# Patient Record
Sex: MALE | Race: BLACK OR AFRICAN AMERICAN | NOT HISPANIC OR LATINO | Employment: FULL TIME | URBAN - METROPOLITAN AREA
[De-identification: names, ages, dates, MRNs, and addresses within clinical notes are randomized per-mention and may not be internally consistent; named-entity substitution may affect disease eponyms.]

---

## 2019-08-06 ENCOUNTER — HOSPITAL ENCOUNTER (EMERGENCY)
Facility: HOSPITAL | Age: 19
Discharge: HOME | End: 2019-08-06
Attending: EMERGENCY MEDICINE

## 2019-08-06 VITALS
HEIGHT: 70 IN | BODY MASS INDEX: 24.34 KG/M2 | RESPIRATION RATE: 18 BRPM | SYSTOLIC BLOOD PRESSURE: 123 MMHG | WEIGHT: 170 LBS | OXYGEN SATURATION: 100 % | DIASTOLIC BLOOD PRESSURE: 67 MMHG | TEMPERATURE: 99 F | HEART RATE: 64 BPM

## 2019-08-06 DIAGNOSIS — R07.89 MUSCULOSKELETAL CHEST PAIN: ICD-10-CM

## 2019-08-06 DIAGNOSIS — R94.31 ABNORMAL EKG: ICD-10-CM

## 2019-08-06 DIAGNOSIS — R07.89 CHEST DISCOMFORT: Primary | ICD-10-CM

## 2019-08-06 LAB
ALBUMIN SERPL BCP-MCNC: 4.1 G/DL (ref 3.5–5.2)
ALP SERPL-CCNC: 53 U/L (ref 55–135)
ALT SERPL W/O P-5'-P-CCNC: 13 U/L (ref 10–44)
ANION GAP SERPL CALC-SCNC: 7 MMOL/L (ref 8–16)
AST SERPL-CCNC: 18 U/L (ref 10–40)
BASOPHILS # BLD AUTO: 0.01 K/UL (ref 0–0.2)
BASOPHILS NFR BLD: 0.3 % (ref 0–1.9)
BILIRUB SERPL-MCNC: 0.6 MG/DL (ref 0.1–1)
BNP SERPL-MCNC: <10 PG/ML (ref 0–99)
BUN SERPL-MCNC: 13 MG/DL (ref 6–20)
CALCIUM SERPL-MCNC: 9.4 MG/DL (ref 8.7–10.5)
CHLORIDE SERPL-SCNC: 107 MMOL/L (ref 95–110)
CO2 SERPL-SCNC: 25 MMOL/L (ref 23–29)
CREAT SERPL-MCNC: 1.1 MG/DL (ref 0.5–1.4)
DIFFERENTIAL METHOD: NORMAL
EOSINOPHIL # BLD AUTO: 0.2 K/UL (ref 0–0.5)
EOSINOPHIL NFR BLD: 4.1 % (ref 0–8)
ERYTHROCYTE [DISTWIDTH] IN BLOOD BY AUTOMATED COUNT: 13 % (ref 11.5–14.5)
EST. GFR  (AFRICAN AMERICAN): >60 ML/MIN/1.73 M^2
EST. GFR  (NON AFRICAN AMERICAN): >60 ML/MIN/1.73 M^2
GLUCOSE SERPL-MCNC: 101 MG/DL (ref 70–110)
HCT VFR BLD AUTO: 44.3 % (ref 40–54)
HGB BLD-MCNC: 14.5 G/DL (ref 14–18)
LYMPHOCYTES # BLD AUTO: 1 K/UL (ref 1–4.8)
LYMPHOCYTES NFR BLD: 25.8 % (ref 18–48)
MCH RBC QN AUTO: 29.5 PG (ref 27–31)
MCHC RBC AUTO-ENTMCNC: 32.7 G/DL (ref 32–36)
MCV RBC AUTO: 90 FL (ref 82–98)
MONOCYTES # BLD AUTO: 0.3 K/UL (ref 0.3–1)
MONOCYTES NFR BLD: 8.2 % (ref 4–15)
NEUTROPHILS # BLD AUTO: 2.4 K/UL (ref 1.8–7.7)
NEUTROPHILS NFR BLD: 61.9 % (ref 38–73)
PLATELET # BLD AUTO: 193 K/UL (ref 150–350)
PMV BLD AUTO: 10.6 FL (ref 9.2–12.9)
POTASSIUM SERPL-SCNC: 4.4 MMOL/L (ref 3.5–5.1)
PROT SERPL-MCNC: 7.5 G/DL (ref 6–8.4)
RBC # BLD AUTO: 4.92 M/UL (ref 4.6–6.2)
SODIUM SERPL-SCNC: 139 MMOL/L (ref 136–145)
TROPONIN I SERPL DL<=0.01 NG/ML-MCNC: <0.006 NG/ML (ref 0–0.03)
WBC # BLD AUTO: 3.91 K/UL (ref 3.9–12.7)

## 2019-08-06 PROCEDURE — 80053 COMPREHEN METABOLIC PANEL: CPT

## 2019-08-06 PROCEDURE — 83880 ASSAY OF NATRIURETIC PEPTIDE: CPT

## 2019-08-06 PROCEDURE — 85025 COMPLETE CBC W/AUTO DIFF WBC: CPT

## 2019-08-06 PROCEDURE — 84484 ASSAY OF TROPONIN QUANT: CPT

## 2019-08-06 PROCEDURE — 93010 EKG 12-LEAD: ICD-10-PCS | Mod: ,,, | Performed by: INTERNAL MEDICINE

## 2019-08-06 PROCEDURE — 99285 EMERGENCY DEPT VISIT HI MDM: CPT

## 2019-08-06 PROCEDURE — 93005 ELECTROCARDIOGRAM TRACING: CPT

## 2019-08-06 PROCEDURE — 93010 ELECTROCARDIOGRAM REPORT: CPT | Mod: ,,, | Performed by: INTERNAL MEDICINE

## 2019-08-06 RX ORDER — NAPROXEN 500 MG/1
500 TABLET ORAL EVERY 12 HOURS PRN
Qty: 20 TABLET | Refills: 0 | Status: SHIPPED | OUTPATIENT
Start: 2019-08-06 | End: 2019-12-11 | Stop reason: CLARIF

## 2019-08-06 NOTE — DISCHARGE INSTRUCTIONS
Follow-up with Cardiology for repeat EKG.  Follow-up with your regular doctor for further testing and treatment as well. Return to the emergency department for any new or worsening symptoms or as needed for any additional concerns.    Thank you for coming to our Emergency Department today. It is important to remember that some problems are difficult to diagnose and may not be found during your first visit. Be sure to follow up with your primary care doctor.  If you do not have one, you may contact the one listed on your discharge paperwork or you may also call the [facility] Appointment Desk at [phone] to schedule an appointment with one.     Return to the ER with any questions/concerns, new/concerning symptoms, worsening or failure to improve. Do not drive or make any important decisions for 24 hours if you have received any pain medications, sedatives or mood altering drugs during your ER visit.

## 2019-08-06 NOTE — ED PROVIDER NOTES
Encounter Date: 8/6/2019    SCRIBE #1 NOTE: I, Mo Neha, am scribing for, and in the presence of,  Raheem Singh NP. I have scribed the following portions of the note - Other sections scribed: HPI, ROS, PE.       History     Chief Complaint   Patient presents with    Chest Pain     midsternal cp with associated sob x 2 days. pt describes pain as sharp. he denies any recent illness     19 y.o. male with PMHx of heart murmur presents to the ED with complaints of right chest pain and associated back pain beginning 2 days ago. Pain has been constant. Patient reports taking Advil and Tylenol for Sx, with some mild relief. 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 Pain is worsened with movement and lifting. Pain is not worsened with increased cardiovascular activity. Denies shortness of breath, abd pain, syncope, nausea, vomiting, diaphoresis, or any additional symptoms.          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 No pertinent surgical history.  History reviewed. No pertinent family history.  Social History     Tobacco Use    Smoking status: Former Smoker     Types: Cigars    Smokeless tobacco: Never Used    Tobacco comment: pt. reports he stopped smoning black and milds about 2 weeks ago ( 7/20/19)   Substance Use Topics    Alcohol use: Never     Frequency: Never    Drug use: Never     Review of Systems   Constitutional: Negative for fever.   HENT: Negative for sore throat.    Respiratory: Negative for shortness of breath.    Cardiovascular: Positive for chest pain.   Gastrointestinal: Negative for nausea.   Genitourinary: Negative for dysuria.   Musculoskeletal: Positive for back pain.   Skin: Negative for rash.   Neurological: Negative for weakness.   Hematological: Does not bruise/bleed easily.       Physical Exam     Initial  Vitals [08/06/19 1229]   BP Pulse Resp Temp SpO2   129/73 66 15 98.3 °F (36.8 °C) 99 %      MAP       --         Physical Exam    Nursing note and vitals reviewed.  Constitutional: He appears well-developed and well-nourished. He is not diaphoretic. No distress.   HENT:   Head: Normocephalic and atraumatic.   Mouth/Throat: Oropharynx is clear and moist.   Eyes: Conjunctivae and EOM are normal. Pupils are equal, round, and reactive to light. No scleral icterus.   Neck: Normal range of motion. Neck supple. No JVD present.   Cardiovascular: Normal rate, regular rhythm and intact distal pulses.   Pulmonary/Chest: Effort normal and breath sounds normal. No accessory muscle usage or stridor. No tachypnea. No respiratory distress. He exhibits tenderness.   Tenderness to the right anterior chest over the medial right pectoral muscle.   Abdominal: Soft. Bowel sounds are normal. He exhibits no distension. There is no tenderness.   Musculoskeletal: Normal range of motion. He exhibits no edema or tenderness.   Generalized tenderness to the thoracolumbar paraspinal musculature.   No midline thoracic or lumbar tenderness.    Neurological: He is alert and oriented to person, place, and time. He has normal strength. No cranial nerve deficit or sensory deficit.   Skin: Skin is warm and dry. No rash noted.   Psychiatric: He has a normal mood and affect.         ED Course   Procedures  Labs Reviewed   COMPREHENSIVE METABOLIC PANEL - Abnormal; Notable for the following components:       Result Value    Alkaline Phosphatase 53 (*)     Anion Gap 7 (*)     All other components within normal limits   CBC W/ AUTO DIFFERENTIAL   TROPONIN I   B-TYPE NATRIURETIC PEPTIDE        ECG Results          EKG 12-lead (Final result)  Result time 08/06/19 21:14:27    Final result by Interface, Lab In [facility] (08/06/19 21:14:27)                 Narrative:    Test Reason : R07.89,    Vent. Rate : 059 BPM     Atrial Rate : 059 BPM     P-R Int : 132 ms           QRS Dur : 094 ms      QT Int : 370 ms       P-R-T Axes : 023 076 024 degrees     QTc Int : 366 ms    Sinus bradycardia  Voltage criteria for left ventricular hypertrophy  Abnormal ECG  No previous ECGs available  Confirmed by Jovanny Davies MD ([id number]) on 8/6/2019 9:14:19 PM    Referred By: NAVNEET   SELF           Confirmed By:Jovanny Davies MD                            Imaging Results          X-Ray Chest PA And Lateral (Final result)  Result time 08/06/19 12:58:35    Final result by Gray Everett MD (08/06/19 12:58:35)                 Impression:      See above      Electronically signed by: Gray Everett MD  Date:    08/06/2019  Time:    12:58             Narrative:    EXAMINATION:  XR CHEST PA AND LATERAL    CLINICAL HISTORY:  Other chest pain    TECHNIQUE:  PA and lateral views of the chest were performed.    COMPARISON:  None    FINDINGS:  Heart size normal.  The lungs are clear.  No pleural effusion                                 Medical Decision Making:   History:   Old Medical Records: I decided to obtain old medical records.  Differential Diagnosis:   Musculoskeletal pain, myocardial ischemia, malignant arrhythmia, pericarditis, pneumothorax, PE, others  Clinical Tests:   Lab Tests: Ordered and Reviewed  Radiological Study: Ordered and Reviewed  ED Management:  HPI and physical exam as above.    19 year old male with right-sided chest and thoracolumbar back pain that has been constant for the past two days. No known cardiac risk factors. HEART score is 0. Pt is PERC negative. Ordered labs due to EKG with possible LVH and bradycardia. Labs are reassuring. Chest x-ray is unremarkable. Given the above, pt's history of lifting heavy objects at work, and the fact that chest pain is reproducible the etiology of the pt's symptoms is most likely musculoskeletal. Will treat with NSAIDs and a short course of muscle relaxers. Advised patient to follow up with cardiology for abnormal EKG and for  re-evaluation and further management.  ED return precautions given. All questions regarding diagnosis and plan were answered to the patient's fullest possible satisfaction. Patient expressed understanding of diagnosis, discharge instructions, and return precautions.        Scribe attestation: I, Raheem Singh NP, personally performed the services described in this documentation. All medical record entries made by the scribe were at my direction and in my presence.  I have reviewed the chart and agree that the record reflects my personal performance and is accurate and complete.                   Clinical Impression:       ICD-10-CM ICD-9-CM   1. Chest discomfort R07.89 786.59   2. Musculoskeletal chest pain R07.89 786.59   3. Abnormal EKG R94.31 794.31         Disposition:   Disposition: Discharged  Condition: Stable                        Raheem Singh NP  08/08/19 1214

## 2019-09-17 ENCOUNTER — HOSPITAL ENCOUNTER (OUTPATIENT)
Facility: HOSPITAL | Age: 19
Discharge: HOME | End: 2019-09-18
Attending: EMERGENCY MEDICINE | Admitting: OTOLARYNGOLOGY
Payer: MEDICAID

## 2019-09-17 DIAGNOSIS — J36 PERITONSILLAR ABSCESS: ICD-10-CM

## 2019-09-17 DIAGNOSIS — J03.90 TONSILLITIS: Primary | ICD-10-CM

## 2019-09-17 DIAGNOSIS — J02.9 SORE THROAT: ICD-10-CM

## 2019-09-17 LAB
ALBUMIN SERPL BCP-MCNC: 4.4 G/DL (ref 3.5–5.2)
ALP SERPL-CCNC: 57 U/L (ref 55–135)
ALT SERPL W/O P-5'-P-CCNC: 16 U/L (ref 10–44)
ANION GAP SERPL CALC-SCNC: 9 MMOL/L (ref 8–16)
ANION GAP SERPL CALC-SCNC: 9 MMOL/L (ref 8–16)
AST SERPL-CCNC: 21 U/L (ref 10–40)
BASOPHILS # BLD AUTO: 0.01 K/UL (ref 0–0.2)
BASOPHILS NFR BLD: 0.1 % (ref 0–1.9)
BILIRUB SERPL-MCNC: 1.4 MG/DL (ref 0.1–1)
BUN SERPL-MCNC: 10 MG/DL (ref 6–30)
BUN SERPL-MCNC: 9 MG/DL (ref 6–20)
CALCIUM SERPL-MCNC: 9.9 MG/DL (ref 8.7–10.5)
CHLORIDE SERPL-SCNC: 103 MMOL/L (ref 95–110)
CHLORIDE SERPL-SCNC: 104 MMOL/L (ref 95–110)
CO2 SERPL-SCNC: 26 MMOL/L (ref 23–29)
CREAT SERPL-MCNC: 1.1 MG/DL (ref 0.5–1.4)
CREAT SERPL-MCNC: 1.2 MG/DL (ref 0.5–1.4)
CTP QC/QA: YES
DEPRECATED S PYO AG THROAT QL EIA: NEGATIVE
DIFFERENTIAL METHOD: ABNORMAL
EOSINOPHIL # BLD AUTO: 0 K/UL (ref 0–0.5)
EOSINOPHIL NFR BLD: 0.3 % (ref 0–8)
ERYTHROCYTE [DISTWIDTH] IN BLOOD BY AUTOMATED COUNT: 12.7 % (ref 11.5–14.5)
EST. GFR  (AFRICAN AMERICAN): >60 ML/MIN/1.73 M^2
EST. GFR  (NON AFRICAN AMERICAN): >60 ML/MIN/1.73 M^2
GLUCOSE SERPL-MCNC: 97 MG/DL (ref 70–110)
GLUCOSE SERPL-MCNC: 98 MG/DL (ref 70–110)
HCT VFR BLD AUTO: 46.6 % (ref 40–54)
HCT VFR BLD CALC: 47 %PCV (ref 36–54)
HGB BLD-MCNC: 15.4 G/DL (ref 14–18)
LACTATE SERPL-SCNC: 1.1 MMOL/L (ref 0.5–2.2)
LYMPHOCYTES # BLD AUTO: 0.9 K/UL (ref 1–4.8)
LYMPHOCYTES NFR BLD: 10.9 % (ref 18–48)
MCH RBC QN AUTO: 29.6 PG (ref 27–31)
MCHC RBC AUTO-ENTMCNC: 33 G/DL (ref 32–36)
MCV RBC AUTO: 89 FL (ref 82–98)
MONOCYTES # BLD AUTO: 1.2 K/UL (ref 0.3–1)
MONOCYTES NFR BLD: 14.4 % (ref 4–15)
NEUTROPHILS # BLD AUTO: 6.4 K/UL (ref 1.8–7.7)
NEUTROPHILS NFR BLD: 74.4 % (ref 38–73)
PLATELET # BLD AUTO: 188 K/UL (ref 150–350)
PMV BLD AUTO: 11 FL (ref 9.2–12.9)
POC IONIZED CALCIUM: 1.07 MMOL/L (ref 1.06–1.42)
POC MOLECULAR INFLUENZA A AGN: NEGATIVE
POC MOLECULAR INFLUENZA B AGN: NEGATIVE
POC TCO2 (MEASURED): 31 MMOL/L (ref 23–29)
POTASSIUM BLD-SCNC: 8.6 MMOL/L (ref 3.5–5.1)
POTASSIUM SERPL-SCNC: 3.9 MMOL/L (ref 3.5–5.1)
PROT SERPL-MCNC: 8.3 G/DL (ref 6–8.4)
RBC # BLD AUTO: 5.21 M/UL (ref 4.6–6.2)
SAMPLE: ABNORMAL
SODIUM BLD-SCNC: 134 MMOL/L (ref 136–145)
SODIUM SERPL-SCNC: 139 MMOL/L (ref 136–145)
WBC # BLD AUTO: 8.63 K/UL (ref 3.9–12.7)

## 2019-09-17 PROCEDURE — 84295 ASSAY OF SERUM SODIUM: CPT

## 2019-09-17 PROCEDURE — 83605 ASSAY OF LACTIC ACID: CPT

## 2019-09-17 PROCEDURE — 25000003 PHARM REV CODE 250: Performed by: NURSE PRACTITIONER

## 2019-09-17 PROCEDURE — 87040 BLOOD CULTURE FOR BACTERIA: CPT | Mod: 59

## 2019-09-17 PROCEDURE — 93010 ELECTROCARDIOGRAM REPORT: CPT | Mod: ,,, | Performed by: INTERNAL MEDICINE

## 2019-09-17 PROCEDURE — 93005 ELECTROCARDIOGRAM TRACING: CPT

## 2019-09-17 PROCEDURE — 96361 HYDRATE IV INFUSION ADD-ON: CPT

## 2019-09-17 PROCEDURE — 87081 CULTURE SCREEN ONLY: CPT

## 2019-09-17 PROCEDURE — 99900035 HC TECH TIME PER 15 MIN (STAT)

## 2019-09-17 PROCEDURE — 99285 EMERGENCY DEPT VISIT HI MDM: CPT | Mod: 25

## 2019-09-17 PROCEDURE — 87502 INFLUENZA DNA AMP PROBE: CPT

## 2019-09-17 PROCEDURE — 87880 STREP A ASSAY W/OPTIC: CPT

## 2019-09-17 PROCEDURE — 82330 ASSAY OF CALCIUM: CPT

## 2019-09-17 PROCEDURE — 82565 ASSAY OF CREATININE: CPT

## 2019-09-17 PROCEDURE — 96375 TX/PRO/DX INJ NEW DRUG ADDON: CPT

## 2019-09-17 PROCEDURE — 25500020 PHARM REV CODE 255: Performed by: EMERGENCY MEDICINE

## 2019-09-17 PROCEDURE — 63600175 PHARM REV CODE 636 W HCPCS: Performed by: NURSE PRACTITIONER

## 2019-09-17 PROCEDURE — 96372 THER/PROPH/DIAG INJ SC/IM: CPT | Mod: 59

## 2019-09-17 PROCEDURE — 80053 COMPREHEN METABOLIC PANEL: CPT

## 2019-09-17 PROCEDURE — 85025 COMPLETE CBC W/AUTO DIFF WBC: CPT

## 2019-09-17 PROCEDURE — 93010 EKG 12-LEAD: ICD-10-PCS | Mod: ,,, | Performed by: INTERNAL MEDICINE

## 2019-09-17 PROCEDURE — S0077 INJECTION, CLINDAMYCIN PHOSP: HCPCS | Performed by: NURSE PRACTITIONER

## 2019-09-17 PROCEDURE — 84132 ASSAY OF SERUM POTASSIUM: CPT

## 2019-09-17 PROCEDURE — 96365 THER/PROPH/DIAG IV INF INIT: CPT

## 2019-09-17 PROCEDURE — 85014 HEMATOCRIT: CPT

## 2019-09-17 RX ORDER — KETOROLAC TROMETHAMINE 30 MG/ML
15 INJECTION, SOLUTION INTRAMUSCULAR; INTRAVENOUS
Status: COMPLETED | OUTPATIENT
Start: 2019-09-17 | End: 2019-09-17

## 2019-09-17 RX ORDER — MORPHINE SULFATE 10 MG/ML
4 INJECTION INTRAMUSCULAR; INTRAVENOUS; SUBCUTANEOUS
Status: COMPLETED | OUTPATIENT
Start: 2019-09-17 | End: 2019-09-17

## 2019-09-17 RX ORDER — ACETAMINOPHEN 500 MG
1000 TABLET ORAL
Status: DISPENSED | OUTPATIENT
Start: 2019-09-17 | End: 2019-09-18

## 2019-09-17 RX ORDER — DEXAMETHASONE SODIUM PHOSPHATE 4 MG/ML
12 INJECTION, SOLUTION INTRA-ARTICULAR; INTRALESIONAL; INTRAMUSCULAR; INTRAVENOUS; SOFT TISSUE
Status: COMPLETED | OUTPATIENT
Start: 2019-09-17 | End: 2019-09-17

## 2019-09-17 RX ORDER — CLINDAMYCIN PHOSPHATE 900 MG/50ML
900 INJECTION, SOLUTION INTRAVENOUS
Status: COMPLETED | OUTPATIENT
Start: 2019-09-17 | End: 2019-09-17

## 2019-09-17 RX ADMIN — CLINDAMYCIN IN 5 PERCENT DEXTROSE 900 MG: 18 INJECTION, SOLUTION INTRAVENOUS at 06:09

## 2019-09-17 RX ADMIN — KETOROLAC TROMETHAMINE 15 MG: 30 INJECTION, SOLUTION INTRAMUSCULAR; INTRAVENOUS at 04:09

## 2019-09-17 RX ADMIN — DEXAMETHASONE SODIUM PHOSPHATE 12 MG: 4 INJECTION, SOLUTION INTRA-ARTICULAR; INTRALESIONAL; INTRAMUSCULAR; INTRAVENOUS; SOFT TISSUE at 06:09

## 2019-09-17 RX ADMIN — IOHEXOL 75 ML: 350 INJECTION, SOLUTION INTRAVENOUS at 06:09

## 2019-09-17 RX ADMIN — MORPHINE SULFATE 4 MG: 10 INJECTION INTRAVENOUS at 07:09

## 2019-09-17 RX ADMIN — SODIUM CHLORIDE 1000 ML: 0.9 INJECTION, SOLUTION INTRAVENOUS at 07:09

## 2019-09-17 NOTE — PROGRESS NOTES
Results for TONY MONTOYA (MRN [id number]) as of 9/17/2019 18:21   Ref. Range 9/17/2019 16:47 9/17/2019 17:27   POC Sodium Latest Ref Range: 136 - 145 mmol/L  134 (L)   POC Potassium Latest Ref Range: 3.5 - 5.1 mmol/L  8.6 (HH)   POC Chloride Latest Ref Range: 95 - 110 mmol/L  103   POC Anion Gap Latest Ref Range: 8 - 16 mmol/L  9   POC BUN Latest Ref Range: 6 - 30 mg/dL  10   POC Creatinine Latest Ref Range: 0.5 - 1.4 mg/dL  1.1   POC Glucose Latest Ref Range: 70 - 110 mg/dL  97   POC Ionized Calcium Latest Ref Range: 1.06 - 1.42 mmol/L  1.07   POC Hematocrit Latest Ref Range: 36 - 54 %PCV  47   POC TCO2 (MEASURED) Latest Ref Range: 23 - 29 mmol/L  31 (H)   Sample Unknown  VENOUS   POC Molecular Influenza A Ag Latest Ref Range: Negative, Not Reported  Negative    POC Molecular Influenza B Ag Latest Ref Range: Negative, Not Reported  Negative     Acceptable Unknown Yes

## 2019-09-17 NOTE — ED PROVIDER NOTES
"Encounter Date: 9/17/2019    SCRIBE #1 NOTE: I, Caridad CravenZackMaier, am scribing for, and in the presence of, Naa Bruno NP. Other sections scribed: HPI, ROS.       History     Chief Complaint   Patient presents with    Sore Throat     Pt reports sore throat and " I been feeling bad for two days". Pt denies shortness of breath.     CC: Sore Throat    HPI:  This is a 19 y.o. Male, with no pertinent PMHx, who presents to the Emergency Department with a cc of a sore throat x2 days. Pt reports associated cough productive of sputum, fever, right ear pain, chills, and trouble swallowing. Pt denies rhinorrhea and congestion. Pt further denies smoking, drug use, and any recent antibiotic/steroid use. Pt used lozenges, but had little to no relief. No allergies were reported. Pt states that he is unsure about recent contact with anyone experiencing similar symptoms.          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 No pertinent surgical history.  History reviewed. No pertinent family history.  Social History     Tobacco Use    Smoking status: Former Smoker     Types: Cigars    Smokeless tobacco: Never Used    Tobacco comment: pt. reports he stopped smoning black and milds about 2 weeks ago ( 7/20/19)   Substance Use Topics    Alcohol use: Never     Frequency: Never    Drug use: Never     Review of Systems   Constitutional: Positive for chills and fever.   HENT: Positive for ear pain, sore throat and trouble swallowing. Negative for congestion and rhinorrhea.    Eyes: Negative for pain and visual disturbance.   Respiratory: Negative for shortness of breath.    Cardiovascular: Negative for chest pain.   Gastrointestinal: Negative for abdominal pain, nausea and vomiting.   Genitourinary: Negative for dysuria and flank pain.   Skin: Negative for rash.   Neurological: Negative for " headaches.   Psychiatric/Behavioral: Negative for confusion.       Physical Exam     Initial Vitals [09/17/19 1607]   BP Pulse Resp Temp SpO2   126/65 70 18 99.5 °F (37.5 °C) 100 %      MAP       --         Physical Exam    Vitals reviewed.  Constitutional: He appears well-developed and well-nourished. He is not diaphoretic.  Non-toxic appearance. He does not have a sickly appearance. He does not appear ill. No distress.   HENT:   Head: Normocephalic and atraumatic.   Right Ear: Hearing, tympanic membrane, external ear and ear canal normal.   Left Ear: Hearing, tympanic membrane, external ear and ear canal normal.   Nose: Nose normal.   Mouth/Throat: No trismus in the jaw. No uvula swelling. Posterior oropharyngeal edema and posterior oropharyngeal erythema present.   Asymmetrical swelling of the tonsils with right greater than left.  Cervical lymphadenopathy on the right.   Neck: Normal range of motion.   Cardiovascular: Normal rate, regular rhythm, normal heart sounds and intact distal pulses.   Pulmonary/Chest: No respiratory distress.   Musculoskeletal: Normal range of motion.   Neurological: He is alert and oriented to person, place, and time. GCS eye subscore is 4. GCS verbal subscore is 5. GCS motor subscore is 6.   Skin: Skin is warm, dry and intact. No rash noted. No erythema.   Psychiatric: He has a normal mood and affect. Thought content normal.         ED Course   Procedures  Labs Reviewed   CBC W/ AUTO DIFFERENTIAL - Abnormal; Notable for the following components:       Result Value    Lymph # 0.9 (*)     Mono # 1.2 (*)     Gran% 74.4 (*)     Lymph% 10.9 (*)     All other components within normal limits   COMPREHENSIVE METABOLIC PANEL - Abnormal; Notable for the following components:    Total Bilirubin 1.4 (*)     All other components within normal limits   ISTAT PROCEDURE - Abnormal; Notable for the following components:    POC Sodium 134 (*)     POC Potassium 8.6 (*)     POC TCO2 (MEASURED) 31 (*)      All other components within normal limits   THROAT SCREEN, RAPID   CULTURE, BLOOD   CULTURE, BLOOD   CULTURE, STREP A,  THROAT   LACTIC ACID, PLASMA   POCT INFLUENZA A/B MOLECULAR   ISTAT CHEM8          Imaging Results          CT Soft Tissue Neck With Contrast (Final result)  Result time 09/17/19 18:40:57    Final result by Danielle Westfall MD (09/17/19 18:40:57)                 Impression:      Tiny right peritonsillar abscesses.  Significant suppurative changes in the right neck.  Obliteration of the right vallecula and piriform sinus.  Mild mass effect on the right airway.  Associated adenopathy.    Right sialoadenitis of the submandibular gland should be considered.  Mild reactive right submandibular adenopathy.      Electronically signed by: Danielle Westfall  Date:    09/17/2019  Time:    18:40             Narrative:    EXAMINATION:  CT SOFT TISSUE NECK WITH    CLINICAL HISTORY:  R/O PTA;    TECHNIQUE:  2.50 mm axial images were obtained through the soft tissues of the neck.  Coronal and sagittal reformatted images were provided.    COMPARISON:  None.    FINDINGS:  There is asymmetrical swelling of the right palatine tonsil.  There are at least 2 low-density mildly ring-enhancing areas within the right palatine tonsil.  The most cephalad measures 14 x 4 mm (series 2 axial image 33).  The more caudal measures 5 x 3 mm (series 2 axial image 36).  Suppurative changes and or mucous is seen about the oval a a. the fat planes of the right neck are obliterated.  There is suppurative changes seen along the right fat planes of the neck.  Mild mass effect exerted on the right airway.  The right vallecula and piriform sinus.  Is obliterated.  There is an enlarged lymph node in the right anterior triangle measuring 16 x 618 mm (series 4 axial image 109).    The right submandibular gland appears to be slightly more echogenic and larger than that of the left.  There are reactive right submandibular lymph nodes.  New there  is mild overlying thickening of the right platysma muscle.                                 Medical Decision Making:   ED Management:  This is a 19-year-old male presenting with sore throat. Patient has asymmetrical swelling of the tonsils with right greater than left.  Significant erythema.  CT scan with small right-sided peritonsillar abscess with significant suppurative changes in the right neck.  There is mild mass effect on the right airway.  Afebrile.  No leukocytosis.  Not acidotic.  Airway is maintained.  Oxygenation 97% on room air.  Patient has been given IV Decadron and clindamycin.  Pain is controlled.  He is maintaining oral secretions.  This case was discussed with ENT Dr. Martinez.  Patient will be transferred to [facility] emergency department for evaluation by ENT.  This case was discussed with my attending physician who examined the patient and agrees with my plan of care.            Scribe Attestation:   Scribe #1: I performed the above scribed service and the documentation accurately describes the services I performed. I attest to the accuracy of the note.    Attending Attestation:     Physician Attestation Statement for NP/PA:   I have conducted a face to face encounter with this patient in addition to the NP/PA, due to Medical Complexity    Other NP/PA Attestation Additions:    History of Present Illness: Patient presents with severe sore throat for 2 days with subjective fever.  Complains of pain with swallowing.  Patient states that he is having difficulty swelling also.  Pain is mostly on the right side.  No change in voice.   Physical Exam: Mild cervical adenopathy on the right.  Asymmetrical swelling of tonsils right is larger than left.  Normal voice.  Trachea in midline. No stridor.  No distress.   Medical Decision Making: CT shows small tonsillar abscess on the right.  There is pertained his of the soft tissues of the posterior pharynx extending out of the tracheal cartilage on  CT.  Airway is maintained.  Given these findings patient will be transferred to [facility] for ENT consult continued IV antibiotics.         Scribe attestation: I, DAVY Bruno, personally performed the services described in this documentation. All medical record entries made by the scribe were at my direction and in my presence.  I have reviewed the chart and agree that the record reflects my personal performance and is accurate and complete.           Clinical Impression:     1. Peritonsillar abscess    2. Hyperkalemia    3. Sore throat          Disposition:   Disposition: Transferred  Condition: Stable                        Naa Bruno NP  09/17/19 2052       Josef Miller MD  09/17/19 0357

## 2019-09-18 VITALS
OXYGEN SATURATION: 100 % | TEMPERATURE: 98 F | BODY MASS INDEX: 23.67 KG/M2 | RESPIRATION RATE: 18 BRPM | SYSTOLIC BLOOD PRESSURE: 130 MMHG | HEART RATE: 75 BPM | WEIGHT: 169.06 LBS | HEIGHT: 71 IN | DIASTOLIC BLOOD PRESSURE: 63 MMHG

## 2019-09-18 PROBLEM — J03.90 TONSILLITIS: Status: ACTIVE | Noted: 2019-09-18

## 2019-09-18 PROCEDURE — G0378 HOSPITAL OBSERVATION PER HR: HCPCS

## 2019-09-18 PROCEDURE — 63600175 PHARM REV CODE 636 W HCPCS: Performed by: STUDENT IN AN ORGANIZED HEALTH CARE EDUCATION/TRAINING PROGRAM

## 2019-09-18 PROCEDURE — S0077 INJECTION, CLINDAMYCIN PHOSP: HCPCS | Performed by: STUDENT IN AN ORGANIZED HEALTH CARE EDUCATION/TRAINING PROGRAM

## 2019-09-18 PROCEDURE — 25000003 PHARM REV CODE 250: Performed by: STUDENT IN AN ORGANIZED HEALTH CARE EDUCATION/TRAINING PROGRAM

## 2019-09-18 RX ORDER — METHYLPREDNISOLONE 4 MG/1
TABLET ORAL
Qty: 1 PACKAGE | Refills: 0 | Status: SHIPPED | OUTPATIENT
Start: 2019-09-18 | End: 2019-10-09

## 2019-09-18 RX ORDER — DEXAMETHASONE SODIUM PHOSPHATE 4 MG/ML
10 INJECTION, SOLUTION INTRA-ARTICULAR; INTRALESIONAL; INTRAMUSCULAR; INTRAVENOUS; SOFT TISSUE EVERY 8 HOURS
Status: DISCONTINUED | OUTPATIENT
Start: 2019-09-18 | End: 2019-09-18 | Stop reason: HOSPADM

## 2019-09-18 RX ORDER — ACETAMINOPHEN 325 MG/1
650 TABLET ORAL EVERY 6 HOURS PRN
Status: DISCONTINUED | OUTPATIENT
Start: 2019-09-18 | End: 2019-09-18 | Stop reason: HOSPADM

## 2019-09-18 RX ORDER — IBUPROFEN 600 MG/1
600 TABLET ORAL EVERY 6 HOURS PRN
Status: DISCONTINUED | OUTPATIENT
Start: 2019-09-18 | End: 2019-09-18 | Stop reason: HOSPADM

## 2019-09-18 RX ORDER — ACETAMINOPHEN 500 MG
1000 TABLET ORAL EVERY 6 HOURS PRN
Qty: 40 TABLET | Refills: 0 | Status: SHIPPED | OUTPATIENT
Start: 2019-09-18 | End: 2019-09-18 | Stop reason: HOSPADM

## 2019-09-18 RX ORDER — CLINDAMYCIN HYDROCHLORIDE 300 MG/1
300 CAPSULE ORAL EVERY 6 HOURS
Qty: 40 CAPSULE | Refills: 0 | Status: SHIPPED | OUTPATIENT
Start: 2019-09-18 | End: 2019-09-28

## 2019-09-18 RX ORDER — CLINDAMYCIN PHOSPHATE 900 MG/50ML
900 INJECTION, SOLUTION INTRAVENOUS
Status: DISCONTINUED | OUTPATIENT
Start: 2019-09-18 | End: 2019-09-18 | Stop reason: HOSPADM

## 2019-09-18 RX ADMIN — CLINDAMYCIN IN 5 PERCENT DEXTROSE 900 MG: 18 INJECTION, SOLUTION INTRAVENOUS at 01:09

## 2019-09-18 RX ADMIN — CLINDAMYCIN IN 5 PERCENT DEXTROSE 900 MG: 18 INJECTION, SOLUTION INTRAVENOUS at 09:09

## 2019-09-18 RX ADMIN — DEXAMETHASONE SODIUM PHOSPHATE 10 MG: 4 INJECTION, SOLUTION INTRAMUSCULAR; INTRAVENOUS at 08:09

## 2019-09-18 RX ADMIN — DEXAMETHASONE SODIUM PHOSPHATE 10 MG: 4 INJECTION, SOLUTION INTRAMUSCULAR; INTRAVENOUS at 01:09

## 2019-09-18 NOTE — PROGRESS NOTES
Pt left with transportation and  Alfred to get scripts filled downstairs prior to transfer home.  All questions answered.  Pt advised not to take more than 3,000 mg of tylenol in a 24 hours period and verbalized understanding.  Pt also advised to complete 10 day course of medication as prescribed.  PIV removed, cath tip in tact.  Pt left with transport.

## 2019-09-18 NOTE — DISCHARGE SUMMARY
[facility]  Short Stay  Discharge Summary    Admit Date: 9/17/2019    Discharge Date and Time:  09/18/2019 11:46 AM      Discharge Attending Physician: Erlin Martinez MD     Hospital Course (synopsis of major diagnoses, care, treatment, and services provided during the course of the hospital stay): admitted for observation and IV abx overnight. Improved pain and PO intake this morning. Patient states he feels better with no airway complaints. Discharge home with instructions to return with worsening sx.    Final Diagnoses:    Principal Problem: Tonsillitis   Secondary Diagnoses:   Active Hospital Problems    Diagnosis  POA    *Tonsillitis [J03.90]  Unknown      Resolved Hospital Problems   No resolved problems to display.       Discharged Condition: good    Disposition: Home or Self Care    Follow up/Patient Instructions:    Medications:  Reconciled Home Medications:      Medication List      START taking these medications    acetaminophen 500 MG tablet  Commonly known as:  TYLENOL  Take 2 tablets (1,000 mg total) by mouth every 6 (six) hours as needed for Pain.     clindamycin 300 MG capsule  Commonly known as:  CLEOCIN  Take 1 capsule (300 mg total) by mouth every 6 (six) hours. for 10 days     methylPREDNISolone 4 mg tablet  Commonly known as:  MEDROL DOSEPACK  use as directed        CONTINUE taking these medications    naproxen 500 MG tablet  Commonly known as:  NAPROSYN  Take 1 tablet (500 mg total) by mouth every 12 (twelve) hours as needed (Pain).          Discharge Procedure Orders   Diet Adult Regular     Notify your health care provider if you experience any of the following:  temperature >100.4     Notify your health care provider if you experience any of the following:  severe uncontrolled pain     Notify your health care provider if you experience any of the following:  difficulty breathing or increased cough     Activity as tolerated

## 2019-09-18 NOTE — ED PROVIDER NOTES
"Encounter Date: 9/17/2019       History     Chief Complaint   Patient presents with    [facility] Transfer- ENT Eval     Patient transferred for eval of peritonsillar abscess. Patient maintaining airway with no difficulty     Sore Throat     Pt reports sore throat and " I been feeling bad for two days". Pt denies shortness of breath.     Mr Nguyen is a 20 yo male patient with PMHx of heart murmur that presents to the ED as a transfer from [facility] for ENT evaluation of peritonsillar abscess with mild mass effect of airway. Pt given decadron and clindamycin prior to transfer. On arrival patient complaining of sore throat and difficulty swallowing. Denies any difficulty breathing. Rates pain 6/10. Onset of symptoms two days ago. Denies any fevers, chills, body aches, chest pain, shortness of breath, abdominal pain, N/V/D, urinary symptoms.         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 No pertinent surgical history.  History reviewed. No pertinent family history.  Social History     Tobacco Use    Smoking status: Former Smoker     Types: Cigars    Smokeless tobacco: Never Used    Tobacco comment: pt. reports he stopped smoning black and milds about 2 weeks ago ( 7/20/19)   Substance Use Topics    Alcohol use: Never     Frequency: Never    Drug use: Never     Review of Systems   Constitutional: Negative for chills and fever.   HENT: Positive for sore throat and trouble swallowing. Negative for congestion, rhinorrhea, sinus pressure, sinus pain and voice change.    Eyes: Negative for pain and visual disturbance.   Respiratory: Negative for cough and shortness of breath.    Cardiovascular: Negative for chest pain and leg swelling.   Gastrointestinal: Negative for abdominal pain and diarrhea.   Genitourinary: Negative for dysuria, flank pain and frequency. "   Musculoskeletal: Negative for back pain, neck pain and neck stiffness.   Allergic/Immunologic: Negative for immunocompromised state.   Neurological: Negative for dizziness, syncope and headaches.   Hematological: Does not bruise/bleed easily.   Psychiatric/Behavioral: Negative for confusion.       Physical Exam     Initial Vitals [09/17/19 1607]   BP Pulse Resp Temp SpO2   126/65 70 18 99.5 °F (37.5 °C) 100 %      MAP       --         Physical Exam    Constitutional: He appears well-developed and well-nourished. He is cooperative.  Non-toxic appearance. No distress.   HENT:   Head: Normocephalic and atraumatic.   Right Ear: Hearing, tympanic membrane, external ear and ear canal normal.   Left Ear: Hearing, tympanic membrane, external ear and ear canal normal.   Nose: No rhinorrhea.   Mouth/Throat: Uvula is midline and mucous membranes are normal. Posterior oropharyngeal erythema and tonsillar abscesses present.   Eyes: Conjunctivae and EOM are normal. Pupils are equal, round, and reactive to light.   Neck: Normal range of motion. Neck supple. No spinous process tenderness and no muscular tenderness present.   Cardiovascular: Normal rate. Exam reveals no gallop and no friction rub.    No murmur heard.  Pulmonary/Chest: Effort normal. No respiratory distress. He has no decreased breath sounds. He has no wheezes. He has no rhonchi. He has no rales.   Abdominal: Soft. There is no tenderness. There is no rigidity, no rebound, no guarding and no CVA tenderness.   Musculoskeletal: Normal range of motion.   Neurological: He is alert and oriented to person, place, and time.   Skin: Skin is warm and dry.         ED Course   Procedures  Labs Reviewed   CBC W/ AUTO DIFFERENTIAL - Abnormal; Notable for the following components:       Result Value    Lymph # 0.9 (*)     Mono # 1.2 (*)     Gran% 74.4 (*)     Lymph% 10.9 (*)     All other components within normal limits   COMPREHENSIVE METABOLIC PANEL - Abnormal; Notable for the  following components:    Total Bilirubin 1.4 (*)     All other components within normal limits   ISTAT PROCEDURE - Abnormal; Notable for the following components:    POC Sodium 134 (*)     POC Potassium 8.6 (*)     POC TCO2 (MEASURED) 31 (*)     All other components within normal limits   THROAT SCREEN, RAPID   CULTURE, BLOOD   CULTURE, BLOOD   CULTURE, STREP A,  THROAT   LACTIC ACID, PLASMA   POCT INFLUENZA A/B MOLECULAR   ISTAT CHEM8          Imaging Results          CT Soft Tissue Neck With Contrast (Final result)  Result time 09/17/19 18:40:57    Final result by Danielle Westfall MD (09/17/19 18:40:57)                 Impression:      Tiny right peritonsillar abscesses.  Significant suppurative changes in the right neck.  Obliteration of the right vallecula and piriform sinus.  Mild mass effect on the right airway.  Associated adenopathy.    Right sialoadenitis of the submandibular gland should be considered.  Mild reactive right submandibular adenopathy.      Electronically signed by: Danielle Westfall  Date:    09/17/2019  Time:    18:40             Narrative:    EXAMINATION:  CT SOFT TISSUE NECK WITH    CLINICAL HISTORY:  R/O PTA;    TECHNIQUE:  2.50 mm axial images were obtained through the soft tissues of the neck.  Coronal and sagittal reformatted images were provided.    COMPARISON:  None.    FINDINGS:  There is asymmetrical swelling of the right palatine tonsil.  There are at least 2 low-density mildly ring-enhancing areas within the right palatine tonsil.  The most cephalad measures 14 x 4 mm (series 2 axial image 33).  The more caudal measures 5 x 3 mm (series 2 axial image 36).  Suppurative changes and or mucous is seen about the oval a a. the fat planes of the right neck are obliterated.  There is suppurative changes seen along the right fat planes of the neck.  Mild mass effect exerted on the right airway.  The right vallecula and piriform sinus.  Is obliterated.  There is an enlarged lymph node in  the right anterior triangle measuring 16 x 618 mm (series 4 axial image 109).    The right submandibular gland appears to be slightly more echogenic and larger than that of the left.  There are reactive right submandibular lymph nodes.  New there is mild overlying thickening of the right platysma muscle.                                 Medical Decision Making:   Initial Assessment:   Mr Nguyen is a 20 yo male patient with PMHx of heart murmur that presents to the ED as a transfer from [facility] for ENT evaluation of peritonsillar abscess with mild mass effect of airway. Hemodynamically stable. Non-toxic and in no acute distress. No stridor or respiratory compromise.   ED Management:  ENT consulted and will evaluate patient in ED. ENT evaluated patient and will admit for IV abx, steroids and further treatment of management.               Attending Attestation:     Physician Attestation Statement for NP/PA:   I discussed this assessment and plan of this patient with the NP/PA, but I did not personally examine the patient. The face to face encounter was performed by the NP/PA.                     Clinical Impression:       ICD-10-CM ICD-9-CM   1. Peritonsillar abscess J36 475   2. Sore throat J02.9 462         Disposition:   Disposition: Admitted  Condition: Stable                        Jovanny Coronado PA-C  09/18/19 0047

## 2019-09-18 NOTE — CONSULTS
[facility]  Otorhinolaryngology-Head & Neck Surgery  Consult Note    Patient Name: Wilfrido Nguyen  MRN: [id number]  Code Status: No Order  Admission Date: 9/17/2019  Hospital Length of Stay: 0 days  Attending Physician: Stephon Manley MD  Primary Care Provider: Primary Doctor No    Patient information was obtained from patient and ER records.     Inpatient consult to ENT  Consult performed by: Jethro Amaya MD  Consult ordered by: Jovanny Coronado PA-C        Subjective:     Chief Complaint/Reason for Admission: Throat pain    History of Present Illness: 19 year old male with no significant past medical history transferred from [facility] with 2 day history of throat pain and pain when swallowing. Patient did not follow-up with Urgent Care and has not tried a course of antibiotics. Reports he has not eaten a meal for approximately 2 days because of pain. He also reports change in voice. He denies difficulty breathing, noisy breathing, trismus and otalgia. CT Neck Soft Tissue consistent with two hypodensities concerning for peritonsillar abscess with right pharyngeal and hypopharyngeal swelling.    Medications:  Continuous Infusions:  Scheduled Meds:   (pyxis) gi cocktail (mylanta 30 mL, lidocaine 2 % viscous 10 mL, dicyclomine 10 mL) 50 mL   Oral ED 1 Time    acetaminophen  1,000 mg Oral ED 1 Time     PRN Meds:     No current facility-administered medications on file prior to encounter.      Current Outpatient Medications on File Prior to Encounter   Medication Sig    naproxen (NAPROSYN) 500 MG tablet Take 1 tablet (500 mg total) by mouth every 12 (twelve) hours as needed (Pain).       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 No pertinent surgical history.  Family History     None        Tobacco Use    Smoking status: Former Smoker     Types:  Cigars    Smokeless tobacco: Never Used    Tobacco comment: pt. reports he stopped smoning black and milds about 2 weeks ago ( 7/20/19)   Substance and Sexual Activity    Alcohol use: Never     Frequency: Never    Drug use: Never    Sexual activity: Not on file     Review of Systems   Constitutional: Negative for activity change, appetite change and fatigue.   HENT: Positive for sore throat, trouble swallowing and voice change. Negative for congestion, dental problem, drooling, ear pain, facial swelling, sinus pressure, sinus pain and sneezing.    Eyes: Negative for pain, discharge and itching.   Respiratory: Negative for apnea, chest tightness, shortness of breath and stridor.    Cardiovascular: Negative for chest pain and leg swelling.   Gastrointestinal: Negative for abdominal distention and abdominal pain.   Endocrine: Negative for cold intolerance and heat intolerance.   Genitourinary: Negative for difficulty urinating and dysuria.   Musculoskeletal: Negative for arthralgias and back pain.   Allergic/Immunologic: Negative for environmental allergies and food allergies.   Neurological: Negative for dizziness and facial asymmetry.   Hematological: Negative for adenopathy.   Psychiatric/Behavioral: Negative for agitation and behavioral problems.     Objective:     Vital Signs (Most Recent):  Temp: 98.9 °F (37.2 °C) (09/17/19 2248)  Pulse: 66 (09/17/19 2321)  Resp: 12 (09/17/19 2305)  BP: 137/73 (09/17/19 2321)  SpO2: 99 % (09/17/19 2321) Vital Signs (24h Range):  Temp:  [98.6 °F (37 °C)-99.5 °F (37.5 °C)] 98.9 °F (37.2 °C)  Pulse:  [63-70] 66  Resp:  [12-18] 12  SpO2:  [96 %-100 %] 99 %  BP: (117-137)/(60-81) 137/73     Weight: 76.7 kg (169 lb)  Body mass index is 23.57 kg/m².        Physical Exam  Appearance: Awake, alert and oriented. No acute distress.  Eyes: Pupils equal, round and reactive. Extraocular muscles intact. Vision grossly intact.  Nose: External appearance normal.   Ears:  Right: External  appearance normal. External auditory canal within normal limits. Tympanic membrane translucent. No evidence of infection or effusion.  Left: External appearance normal. External auditory canal within normal limits. Tympanic membrane translucent. No evidence of infection or effusion.  Mouth: Water bag edema of uvula. No uvular deviation. Right anterior and posterior pillar slightly edematous. No evidence, clinically, of drainable fluid collection.  Neck: No anterior or posterior cervical lymphadenopathy appreciated.  Respiratory: Normal work of breathing. No stridor or stertor.    Flexible Fiberoptic Laryngoscopy:    Nasal Cavity/Nasopharynx: Normal mucosa, inferior turbinates and septum. No evidence of septal deviation or perforation. There are no visible lesions. Rocio within normal limits.  Oropharynx: Right pharyngeal wall with mild edema. Base of tongue symmetric without masses or lesions. Lingual tonsil hypertrophied.  Hypopharynx: Edema of right hypopharynx.  Supraglottis: There is no edema of the arytenoids, interarytenoid space or epiglottis. Epiglottis is crisp. There are no masses or lesions noted. False vocal folds without masses or lesions.  Glottis: True vocal folds without masses or lesions. Normal mobility and airway patency.                Assessment/Plan:     Tonsillitis  19 year old male with 2 day history of right-sided throat pain and difficulty swallowing. Patient has remained afebrile and has no leukocytosis. Physical exam demonstrates water bag edema of the uvula and right anterior and posterior pillar edema. No evidence of fluctuance or drainable fluid collection. Laryngoscopic exam consistent with right pharyngeal and hypopharyngeal edema. No edema of arytenoids. Airway is widely patent.    - Admit to Observation for IV Antibiotics  - IV Clindamycin TID  - IV Decadron q8h for 24 hours  - Tylenol and Motrin for pain control  - Ok for regular diet  - Please call ENT with airway concerns or  questions      VTE Risk Mitigation (From admission, onward)    None          Jethro Amaya MD  Otorhinolaryngology-Head & Neck Surgery  [facility]

## 2019-09-18 NOTE — PLAN OF CARE
Discharged home with prednisone and antibiotics paid for by case management, [employer] transportation.  No f/u needed.         09/18/19 1358   Final Note   Assessment Type Final Discharge Note   Anticipated Discharge Disposition Home   Hospital Follow Up  Appt(s) scheduled? No   Right Care Referral Info   Post Acute Recommendation No Care

## 2019-09-18 NOTE — ASSESSMENT & PLAN NOTE
19 year old male with 2 day history of right-sided throat pain and difficulty swallowing. Laryngoscopic exam consistent with right pharyngeal and hypopharyngeal edema. No edema of arytenoids. Airway is widely patent. Improved pain and tolerating PO this AM.    - IV Clindamycin TID  - IV Decadron q8h for 24 hours  - Tylenol and Motrin for pain control  - Ok for regular diet  - Will monitor until this afternoon. Please call ENT with airway concerns or questions.

## 2019-09-18 NOTE — ED NOTES
At 0100- pt given food upon request. Pt updated on plan of care. Pt denies needs at this time. Pt without pain. Will continue to monitor.

## 2019-09-18 NOTE — PROGRESS NOTES
Dr. Amaya notified that pt wanting to leave hospital.  RN educated pt that he needs his IV abx at this time and does not have discharge orders.  Pt verbalized understanding and states when he charges his phone he will catch an uber.  RN educated pt on AMA.  MD verbalized understanding.  Stated team will come see pt around noon.  RN let pt know this and the pt verbalized understanding.

## 2019-09-18 NOTE — ED NOTES
At 0000- Pt resting comfortably and independently repositioned in stretcher with bed locked in lowest position for safety. NAD noted at this time. Respirations even and unlabored and visible chest rise noted.  Patient offered bathroom assistance and denies need at this time. Pt instructed to call if assistance is needed. Pt on continuous cardiac, BP, and O2 monitoring. Call light within reach. No needs at this time. Will continue to monitor.

## 2019-09-18 NOTE — PLAN OF CARE
Patient in through ED with tonsillitis 9/17/19.  Plan to dc today with no needs.     Patient lives alone in apartment with 22 steps to front door.  Patient states will get Uber for transportation home.    Update: patient's cell phone needs charge and does not have a , needs ride home.  BEBA Szymanski setting up transport home.    Primary Doctor No      [facility]  [address]  [facility] [address]  Phone: [phone] Fax: [phone]      Extended Emergency Contact Information  Primary Emergency Contact: Junito Nguyen  Mobile Phone: [phone]  Relation: Brother  Preferred language: English   needed? No       09/18/19 1016   Discharge Assessment   Assessment Type Discharge Planning Assessment   Confirmed/corrected address and phone number on facesheet? Yes   Assessment information obtained from? Patient   Expected Length of Stay (days) 2   Communicated expected length of stay with patient/caregiver yes   Prior to hospitilization cognitive status: Alert/Oriented   Prior to hospitalization functional status: Independent   Current cognitive status: Alert/Oriented   Current Functional Status: Independent   Lives With alone   Able to Return to Prior Arrangements yes   Is patient able to care for self after discharge? Yes   Who are your caregiver(s) and their phone number(s)? no one   Patient's perception of discharge disposition home or selfcare   Equipment Currently Used at Home none   Do you have any problems affording any of your prescribed medications? Yes   If yes, what medications? uninsured   Does the patient have transportation home? Yes   Transportation Anticipated other (see comments)  (Uber)   Discharge Plan A Home   DME Needed Upon Discharge  none   Patient/Family in Agreement with Plan yes

## 2019-09-18 NOTE — SUBJECTIVE & OBJECTIVE
Interval History: No acute events over night. Reports improved pain and able to swallow. Tolerating PO.    Medications:  Continuous Infusions:  Scheduled Meds:   clindamycin (CLEOCIN) IVPB  900 mg Intravenous Q8H    dexamethasone  10 mg Intravenous Q8H     PRN Meds:acetaminophen, ibuprofen     Review of patient's allergies indicates:  No Known Allergies  Objective:     Vital Signs (24h Range):  Temp:  [97.6 °F (36.4 °C)-99.5 °F (37.5 °C)] 97.6 °F (36.4 °C)  Pulse:  [63-78] 75  Resp:  [12-20] 18  SpO2:  [96 %-100 %] 100 %  BP: (117-146)/(60-81) 130/63       Lines/Drains/Airways     Peripheral Intravenous Line                 Peripheral IV - Single Lumen 09/17/19 1713 20 G Right Antecubital less than 1 day                Physical Exam  Appearance: Awake, alert and oriented. No acute distress. Sleeping comfortably.  Ears:  Mouth: Mucosal membranes moist. Tongue mobile. Oropharynx clear and patent.  Neck: No anterior or posterior cervical lymphadenopathy.  Respiratory: Normal work of breathing. No stridor or stertor

## 2019-09-18 NOTE — HPI
19 year old male with no significant past medical history transferred from [facility] with 2 day history of throat pain and pain when swallowing. Patient did not follow-up with Urgent Care and has not tried a course of antibiotics. Reports he has not eaten a meal for approximately 2 days because of pain. He also reports change in voice. He denies difficulty breathing, noisy breathing, trismus and otalgia. CT Neck Soft Tissue consistent with two hypodensities concerning for peritonsillar abscess with right pharyngeal and hypopharyngeal swelling.

## 2019-09-18 NOTE — ASSESSMENT & PLAN NOTE
19 year old male with 2 day history of right-sided throat pain and difficulty swallowing. Patient has remained afebrile and has no leukocytosis. Physical exam demonstrates water bag edema of the uvula and right anterior and posterior pillar edema. No evidence of fluctuance or drainable fluid collection. Laryngoscopic exam consistent with right pharyngeal and hypopharyngeal edema. No edema of arytenoids. Airway is widely patent.    - Admit to Observation for IV Antibiotics  - IV Clindamycin TID  - IV Decadron q8h for 24 hours  - Tylenol and Motrin for pain control  - Ok for regular diet  - Please call ENT with airway concerns or questions

## 2019-09-18 NOTE — H&P
Subjective:      Chief Complaint/Reason for Admission: Throat pain     History of Present Illness: 19 year old male with no significant past medical history transferred from [facility] with 2 day history of throat pain and pain when swallowing. Patient did not follow-up with Urgent Care and has not tried a course of antibiotics. Reports he has not eaten a meal for approximately 2 days because of pain. He also reports change in voice. He denies difficulty breathing, noisy breathing, trismus and otalgia. CT Neck Soft Tissue consistent with two hypodensities concerning for peritonsillar abscess with right pharyngeal and hypopharyngeal swelling.     Medications:  Continuous Infusions:  Scheduled Meds:   (pyxis) gi cocktail (mylanta 30 mL, lidocaine 2 % viscous 10 mL, dicyclomine 10 mL) 50 mL   Oral ED 1 Time    acetaminophen  1,000 mg Oral ED 1 Time      PRN Meds:      No current facility-administered medications on file prior to encounter.            Current Outpatient Medications on File Prior to Encounter   Medication Sig    naproxen (NAPROSYN) 500 MG tablet Take 1 tablet (500 mg total) by mouth every 12 (twelve) hours as needed (Pain).         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 No pertinent surgical history.      Family History      None                Tobacco Use    Smoking status: Former Smoker       Types: Cigars    Smokeless tobacco: Never Used    Tobacco comment: pt. reports he stopped smoning black and milds about 2 weeks ago ( 7/20/19)   Substance and Sexual Activity    Alcohol use: Never       Frequency: Never    Drug use: Never    Sexual activity: Not on file      Review of Systems   Constitutional: Negative for activity change, appetite change and fatigue.   HENT: Positive for sore throat, trouble swallowing and voice change. Negative  for congestion, dental problem, drooling, ear pain, facial swelling, sinus pressure, sinus pain and sneezing.    Eyes: Negative for pain, discharge and itching.   Respiratory: Negative for apnea, chest tightness, shortness of breath and stridor.    Cardiovascular: Negative for chest pain and leg swelling.   Gastrointestinal: Negative for abdominal distention and abdominal pain.   Endocrine: Negative for cold intolerance and heat intolerance.   Genitourinary: Negative for difficulty urinating and dysuria.   Musculoskeletal: Negative for arthralgias and back pain.   Allergic/Immunologic: Negative for environmental allergies and food allergies.   Neurological: Negative for dizziness and facial asymmetry.   Hematological: Negative for adenopathy.   Psychiatric/Behavioral: Negative for agitation and behavioral problems.      Objective:      Vital Signs (Most Recent):  Temp: 98.9 °F (37.2 °C) (09/17/19 2248)  Pulse: 66 (09/17/19 2321)  Resp: 12 (09/17/19 2305)  BP: 137/73 (09/17/19 2321)  SpO2: 99 % (09/17/19 2321) Vital Signs (24h Range):  Temp:  [98.6 °F (37 °C)-99.5 °F (37.5 °C)] 98.9 °F (37.2 °C)  Pulse:  [63-70] 66  Resp:  [12-18] 12  SpO2:  [96 %-100 %] 99 %  BP: (117-137)/(60-81) 137/73      Weight: 76.7 kg (169 lb)  Body mass index is 23.57 kg/m².           Physical Exam  Appearance: Awake, alert and oriented. No acute distress.  Eyes: Pupils equal, round and reactive. Extraocular muscles intact. Vision grossly intact.  Nose: External appearance normal.   Ears:  Right: External appearance normal. External auditory canal within normal limits. Tympanic membrane translucent. No evidence of infection or effusion.  Left: External appearance normal. External auditory canal within normal limits. Tympanic membrane translucent. No evidence of infection or effusion.  Mouth: Water bag edema of uvula. No uvular deviation. Right anterior and posterior pillar slightly edematous. No evidence, clinically, of drainable fluid  collection.  Neck: No anterior or posterior cervical lymphadenopathy appreciated.  Respiratory: Normal work of breathing. No stridor or stertor.     Flexible Fiberoptic Laryngoscopy:     Nasal Cavity/Nasopharynx: Normal mucosa, inferior turbinates and septum. No evidence of septal deviation or perforation. There are no visible lesions. Rocio within normal limits.  Oropharynx: Right pharyngeal wall with mild edema. Base of tongue symmetric without masses or lesions. Lingual tonsil hypertrophied.  Hypopharynx: Edema of right hypopharynx.  Supraglottis: There is no edema of the arytenoids, interarytenoid space or epiglottis. Epiglottis is crisp. There are no masses or lesions noted. False vocal folds without masses or lesions.  Glottis: True vocal folds without masses or lesions. Normal mobility and airway patency.                       Assessment/Plan:      Tonsillitis  19 year old male with 2 day history of right-sided throat pain and difficulty swallowing. Patient has remained afebrile and has no leukocytosis. Physical exam demonstrates water bag edema of the uvula and right anterior and posterior pillar edema. No evidence of fluctuance or drainable fluid collection. Laryngoscopic exam consistent with right pharyngeal and hypopharyngeal edema. No edema of arytenoids. Airway is widely patent.     - Admit to Observation for IV Antibiotics  - IV Clindamycin TID  - IV Decadron q8h for 24 hours  - Tylenol and Motrin for pain control  - Ok for regular diet  - Please call ENT with airway concerns or questions

## 2019-09-18 NOTE — PROGRESS NOTES
[facility]  Otorhinolaryngology-Head & Neck Surgery  Progress Note    Subjective:     Post-Op Info:  * No surgery found *      Hospital Day: 2     Interval History: No acute events over night. Reports improved pain and able to swallow. Tolerating PO.    Medications:  Continuous Infusions:  Scheduled Meds:   clindamycin (CLEOCIN) IVPB  900 mg Intravenous Q8H    dexamethasone  10 mg Intravenous Q8H     PRN Meds:acetaminophen, ibuprofen     Review of patient's allergies indicates:  No Known Allergies  Objective:     Vital Signs (24h Range):  Temp:  [97.6 °F (36.4 °C)-99.5 °F (37.5 °C)] 97.6 °F (36.4 °C)  Pulse:  [63-78] 75  Resp:  [12-20] 18  SpO2:  [96 %-100 %] 100 %  BP: (117-146)/(60-81) 130/63       Lines/Drains/Airways     Peripheral Intravenous Line                 Peripheral IV - Single Lumen 09/17/19 1713 20 G Right Antecubital less than 1 day                Physical Exam  Appearance: Awake, alert and oriented. No acute distress.  Ears:  Mouth: Uvular waterbag edema improved.  Neck: No anterior or posterior cervical lymphadenopathy.  Respiratory: Normal work of breathing. No stridor or stertor            Assessment/Plan:     * Tonsillitis  19 year old male with 2 day history of right-sided throat pain and difficulty swallowing. Laryngoscopic exam consistent with right pharyngeal and hypopharyngeal edema. No edema of arytenoids. Airway is widely patent. Improved pain and tolerating PO this AM.    - IV Clindamycin TID  - IV Decadron q8h for 24 hours  - Tylenol and Motrin for pain control  - Ok for regular diet  - Will monitor until this afternoon. Please call ENT with airway concerns or questions.        Jethro Amaya MD  Otorhinolaryngology-Head & Neck Surgery  [facility]

## 2019-09-18 NOTE — PLAN OF CARE
Problem: Adult Inpatient Plan of Care  Goal: Plan of Care Review  Outcome: Ongoing (interventions implemented as appropriate)  Pt AAOX4. POC reviewed with pt. Pt came from ED. Pt is able to communicate needs to staff. Pt is independent no assistance is required. Pt has a 20 G in R AC, no fluids infusing at this time. Pt is on room air. VSS. UO good. Pt admitted with tonsillitis, pt denies any swallowing/ talking difficulty. Pt denies any pain. Pt remained free from falls and injuries, no acute changes noted at this time. Bed in low position, call light within reach, pt left in comfort. WCTM.

## 2019-09-18 NOTE — SUBJECTIVE & OBJECTIVE
Medications:  Continuous Infusions:  Scheduled Meds:   (pyxis) gi cocktail (mylanta 30 mL, lidocaine 2 % viscous 10 mL, dicyclomine 10 mL) 50 mL   Oral ED 1 Time    acetaminophen  1,000 mg Oral ED 1 Time     PRN Meds:     No current facility-administered medications on file prior to encounter.      Current Outpatient Medications on File Prior to Encounter   Medication Sig    naproxen (NAPROSYN) 500 MG tablet Take 1 tablet (500 mg total) by mouth every 12 (twelve) hours as needed (Pain).       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 No pertinent surgical history.  Family History     None        Tobacco Use    Smoking status: Former Smoker     Types: Cigars    Smokeless tobacco: Never Used    Tobacco comment: pt. reports he stopped smoning black and milds about 2 weeks ago ( 7/20/19)   Substance and Sexual Activity    Alcohol use: Never     Frequency: Never    Drug use: Never    Sexual activity: Not on file     Review of Systems   Constitutional: Negative for activity change, appetite change and fatigue.   HENT: Positive for sore throat, trouble swallowing and voice change. Negative for congestion, dental problem, drooling, ear pain, facial swelling, sinus pressure, sinus pain and sneezing.    Eyes: Negative for pain, discharge and itching.   Respiratory: Negative for apnea, chest tightness, shortness of breath and stridor.    Cardiovascular: Negative for chest pain and leg swelling.   Gastrointestinal: Negative for abdominal distention and abdominal pain.   Endocrine: Negative for cold intolerance and heat intolerance.   Genitourinary: Negative for difficulty urinating and dysuria.   Musculoskeletal: Negative for arthralgias and back pain.   Allergic/Immunologic: Negative for environmental allergies and food allergies.   Neurological: Negative for dizziness and facial  asymmetry.   Hematological: Negative for adenopathy.   Psychiatric/Behavioral: Negative for agitation and behavioral problems.     Objective:     Vital Signs (Most Recent):  Temp: 98.9 °F (37.2 °C) (09/17/19 2248)  Pulse: 66 (09/17/19 2321)  Resp: 12 (09/17/19 2305)  BP: 137/73 (09/17/19 2321)  SpO2: 99 % (09/17/19 2321) Vital Signs (24h Range):  Temp:  [98.6 °F (37 °C)-99.5 °F (37.5 °C)] 98.9 °F (37.2 °C)  Pulse:  [63-70] 66  Resp:  [12-18] 12  SpO2:  [96 %-100 %] 99 %  BP: (117-137)/(60-81) 137/73     Weight: 76.7 kg (169 lb)  Body mass index is 23.57 kg/m².        Physical Exam  Appearance: Awake, alert and oriented. No acute distress.  Eyes: Pupils equal, round and reactive. Extraocular muscles intact. Vision grossly intact.  Nose: External appearance normal.   Ears:  Right: External appearance normal. External auditory canal within normal limits. Tympanic membrane translucent. No evidence of infection or effusion.  Left: External appearance normal. External auditory canal within normal limits. Tympanic membrane translucent. No evidence of infection or effusion.  Mouth: Water bag edema of uvula. No uvular deviation. Right anterior and posterior pillar slightly edematous. No evidence, clinically, of drainable fluid collection.  Neck: No anterior or posterior cervical lymphadenopathy appreciated.  Respiratory: Normal work of breathing. No stridor or stertor.    Flexible Fiberoptic Laryngoscopy:    Nasal Cavity/Nasopharynx: Normal mucosa, inferior turbinates and septum. No evidence of septal deviation or perforation. There are no visible lesions. Rocio within normal limits.  Oropharynx: Right pharyngeal wall with mild edema. Base of tongue symmetric without masses or lesions. Lingual tonsil hypertrophied.  Hypopharynx: Edema of right hypopharynx.  Supraglottis: There is no edema of the arytenoids, interarytenoid space or epiglottis. Epiglottis is crisp. There are no masses or lesions noted. False vocal folds without  masses or lesions.  Glottis: True vocal folds without masses or lesions. Normal mobility and airway patency.

## 2019-09-18 NOTE — PLAN OF CARE
KEYUR following for DC needs. KEYUR in communication with [facility]    Patient needs transport home.     SW arranged transport via [facility]. Requested  time is 1:45PM.  Requested  time does not guarantee arrival time.      KEYUR notified nurse of the above.      09/18/19 1240   Post-Acute Status   Post-Acute Authorization Other   Other Status No Post-Acute Service Needs     Shabnam Lopez, BEBA  [facility]  [id number]

## 2019-09-18 NOTE — ED NOTES
Pt states he is getting picked up and will not be driving after discharge. Pt verbalizes understanding of [facility]'s policy regarding needing a ride if narcotics administered
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
## 2019-09-19 LAB — BACTERIA THROAT CULT: NORMAL

## 2019-09-22 LAB
BACTERIA BLD CULT: NORMAL
BACTERIA BLD CULT: NORMAL

## 2019-11-11 ENCOUNTER — HOSPITAL ENCOUNTER (EMERGENCY)
Facility: HOSPITAL | Age: 19
Discharge: HOME | End: 2019-11-12
Attending: EMERGENCY MEDICINE
Payer: MEDICAID

## 2019-11-11 DIAGNOSIS — J02.9 SORE THROAT: Primary | ICD-10-CM

## 2019-11-11 LAB
ALBUMIN SERPL BCP-MCNC: 4.3 G/DL (ref 3.5–5.2)
ALP SERPL-CCNC: 49 U/L (ref 55–135)
ALT SERPL W/O P-5'-P-CCNC: 29 U/L (ref 10–44)
ANION GAP SERPL CALC-SCNC: 6 MMOL/L (ref 8–16)
AST SERPL-CCNC: 28 U/L (ref 10–40)
BASOPHILS # BLD AUTO: 0.03 K/UL (ref 0–0.2)
BASOPHILS NFR BLD: 0.6 % (ref 0–1.9)
BILIRUB SERPL-MCNC: 1.2 MG/DL (ref 0.1–1)
BUN SERPL-MCNC: 9 MG/DL (ref 6–20)
CALCIUM SERPL-MCNC: 9.7 MG/DL (ref 8.7–10.5)
CHLORIDE SERPL-SCNC: 106 MMOL/L (ref 95–110)
CO2 SERPL-SCNC: 26 MMOL/L (ref 23–29)
CREAT SERPL-MCNC: 1 MG/DL (ref 0.5–1.4)
DIFFERENTIAL METHOD: NORMAL
EOSINOPHIL # BLD AUTO: 0.3 K/UL (ref 0–0.5)
EOSINOPHIL NFR BLD: 5.9 % (ref 0–8)
ERYTHROCYTE [DISTWIDTH] IN BLOOD BY AUTOMATED COUNT: 12.7 % (ref 11.5–14.5)
EST. GFR  (AFRICAN AMERICAN): >60 ML/MIN/1.73 M^2
EST. GFR  (NON AFRICAN AMERICAN): >60 ML/MIN/1.73 M^2
GLUCOSE SERPL-MCNC: 106 MG/DL (ref 70–110)
HCT VFR BLD AUTO: 45.4 % (ref 40–54)
HGB BLD-MCNC: 15 G/DL (ref 14–18)
IMM GRANULOCYTES # BLD AUTO: 0.02 K/UL (ref 0–0.04)
IMM GRANULOCYTES NFR BLD AUTO: 0.4 % (ref 0–0.5)
LYMPHOCYTES # BLD AUTO: 1 K/UL (ref 1–4.8)
LYMPHOCYTES NFR BLD: 20 % (ref 18–48)
MCH RBC QN AUTO: 29.1 PG (ref 27–31)
MCHC RBC AUTO-ENTMCNC: 33 G/DL (ref 32–36)
MCV RBC AUTO: 88 FL (ref 82–98)
MONOCYTES # BLD AUTO: 0.4 K/UL (ref 0.3–1)
MONOCYTES NFR BLD: 8.7 % (ref 4–15)
NEUTROPHILS # BLD AUTO: 3.2 K/UL (ref 1.8–7.7)
NEUTROPHILS NFR BLD: 64.4 % (ref 38–73)
NRBC BLD-RTO: 0 /100 WBC
PLATELET # BLD AUTO: 193 K/UL (ref 150–350)
PMV BLD AUTO: 10.5 FL (ref 9.2–12.9)
POTASSIUM SERPL-SCNC: 4.4 MMOL/L (ref 3.5–5.1)
PROT SERPL-MCNC: 7.6 G/DL (ref 6–8.4)
RBC # BLD AUTO: 5.16 M/UL (ref 4.6–6.2)
SODIUM SERPL-SCNC: 138 MMOL/L (ref 136–145)
WBC # BLD AUTO: 4.95 K/UL (ref 3.9–12.7)

## 2019-11-11 PROCEDURE — 80053 COMPREHEN METABOLIC PANEL: CPT

## 2019-11-11 PROCEDURE — 85025 COMPLETE CBC W/AUTO DIFF WBC: CPT

## 2019-11-11 PROCEDURE — 99285 EMERGENCY DEPT VISIT HI MDM: CPT | Mod: 25

## 2019-11-12 VITALS
DIASTOLIC BLOOD PRESSURE: 65 MMHG | OXYGEN SATURATION: 98 % | HEART RATE: 72 BPM | SYSTOLIC BLOOD PRESSURE: 133 MMHG | WEIGHT: 168 LBS | TEMPERATURE: 98 F | BODY MASS INDEX: 23.52 KG/M2 | RESPIRATION RATE: 18 BRPM | HEIGHT: 71 IN

## 2019-11-12 PROCEDURE — 25500020 PHARM REV CODE 255: Performed by: EMERGENCY MEDICINE

## 2019-11-12 RX ADMIN — IOHEXOL 60 ML: 350 INJECTION, SOLUTION INTRAVENOUS at 12:11

## 2019-11-12 NOTE — ED PROVIDER NOTES
Encounter Date: 11/11/2019    SCRIBE #1 NOTE: I, Maureen Calvillo, am scribing for, and in the presence of,  Juan Benavides MD. I have scribed the following portions of the note - Other sections scribed: HPI, ROS, PE, ED Management .       History     Chief Complaint   Patient presents with    Sore Throat     x 2 days; pt states he was supposed to get his tonsils removed back in September but never did; no shortness of breath or difficulty breathing     CC: Sore Throat    HPI: The patient is a 19 y.o male, with PMHx of tonsillitis, who presents to the ED complaining of a sore throat that began 2 days ago. Patient reports of associated trouble swallowing, and pain the right side of the throat. He also reports of an emesis today. Patient states that he was seen at this ED on 09/17/2019 for similar symptoms. He states that he was transferred to [facility] for tonsillectomy, however, he opted not to have the procedure performed. Patient is concerned that the tonsillitis has returned. He denies fever, HA, CP, SOB, abdominal pain, dysuria, rash, and back pain.     The history is provided by the patient. No  was used.     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 No pertinent surgical history.  History reviewed. No pertinent family history.  Social History     Tobacco Use    Smoking status: Former Smoker     Types: Cigars    Smokeless tobacco: Never Used    Tobacco comment: pt. reports he stopped smoning black and milds about 2 weeks ago ( 7/20/19)   Substance Use Topics    Alcohol use: Never     Frequency: Never    Drug use: Never     Review of Systems   Constitutional: Negative for fever.   HENT: Positive for sore throat and trouble swallowing.    Respiratory: Negative for shortness of breath.    Cardiovascular: Negative for chest pain.    Gastrointestinal: Positive for nausea and vomiting. Negative for abdominal pain.   Genitourinary: Negative for dysuria.   Musculoskeletal: Negative for myalgias.   Skin: Negative for rash.   Neurological: Negative for headaches.   Psychiatric/Behavioral: Negative for confusion.       Physical Exam     Initial Vitals [11/11/19 2045]   BP Pulse Resp Temp SpO2   118/72 70 18 98.2 °F (36.8 °C) 98 %      MAP       --         Physical Exam    Nursing note and vitals reviewed.  Constitutional: He appears well-developed and well-nourished. He is not diaphoretic. No distress.   HENT:   Head: Normocephalic and atraumatic.   Mouth/Throat: Oropharynx is clear and moist.   Patient has tenderness to the submandibular area. No obvious fluctuance or induration.   Eyes: Right eye exhibits no discharge. Left eye exhibits no discharge. No scleral icterus.   Neck: No tracheal deviation present. No JVD present.   No lymphadenopathy.   Cardiovascular: Normal rate, regular rhythm, normal heart sounds and intact distal pulses. Exam reveals no gallop and no friction rub.    No murmur heard.  Pulmonary/Chest: Breath sounds normal. No stridor. No respiratory distress.   Abdominal: Soft. There is no tenderness.   Musculoskeletal: Normal range of motion. He exhibits no edema or tenderness.   Lymphadenopathy:     He has no cervical adenopathy.   Neurological: He is alert and oriented to person, place, and time. He has normal strength.   YUGN with NGND's   Skin: Skin is warm and dry. No rash noted. No erythema.   Psychiatric: He has a normal mood and affect. His behavior is normal. Judgment and thought content normal.         ED Course   Procedures  Labs Reviewed   COMPREHENSIVE METABOLIC PANEL - Abnormal; Notable for the following components:       Result Value    Total Bilirubin 1.2 (*)     Alkaline Phosphatase 49 (*)     Anion Gap 6 (*)     All other components within normal limits   CBC W/ AUTO DIFFERENTIAL          Imaging Results           CT Soft Tissue Neck With Contrast (Final result)  Result time 11/12/19 00:22:53    Final result by Faraz Norman MD (11/12/19 00:22:53)                 Impression:      No acute abnormalities identified.  No evidence of peritonsillar or retropharyngeal fluid collection or abscess.      Electronically signed by: Faraz Norman MD  Date:    11/12/2019  Time:    00:22             Narrative:    EXAMINATION:  CT SOFT TISSUE NECK WITH CONTRAST    CLINICAL HISTORY:  sore throat, diagnosed with peritonsillar abscess two months ago and refused surgical drainage;    TECHNIQUE:  Low dose axial images as well as sagittal and coronal reconstructions were performed from the skull base to the clavicles following the intravenous administration of 75 mL of Omnipaque 350.    COMPARISON:  CT soft tissue neck from 09/17/2019.    FINDINGS:  No evidence peritonsillar or retropharyngeal fluid collections or abscess.  Epiglottis is normal in thickness.  No prevertebral soft tissue swelling seen.  Parotid glands and submandibular glands are normal and symmetric in size.  Thyroid gland is normal in appearance.  Normal size cervical chain lymph nodes are seen.    Visualized portion of the brain shows no significant abnormalities.  Orbits are normal in appearance.  Visualized paranasal sinuses and mastoid air cells are clear.  No acute osseous abnormality identified.    Airway is patent.  Visualized lung apices are clear.                                 Medical Decision Making:   History:   Old Medical Records: I decided to obtain old medical records.  Clinical Tests:   Lab Tests: Ordered and Reviewed  Radiological Study: Ordered and Reviewed  ED Management:  2109 Will order CBC and CMP. Will order CT soft tissue neck.     0106 Re-examined patient's oropharynx.             Scribe Attestation:   Scribe #1: I performed the above scribed service and the documentation accurately describes the services I performed. I attest to the accuracy of  the note.    Pt arrived alert, afebrile, non-toxic in appearance, in no acute respiratory distress with VSS.  I had difficulty visualizing the tonsils as the oropharynx view correlated with a Mallampati 3 view.  GIven his recent visit with findings of peritonsillar abscess warranting transfer to [facility] that was never drained I felt it best to evaluate for return of this abscess by ordered a CT soft tissue neck.  Imaging and labs returned unremarkable.  Pt displayed no stridor or any signs of respiratory distress and tolerated PO intake w/o difficulty.  Pt discharged and counseled on the need to return to the nearest emergency room if they experience any other concerning symptoms.  Pt counseled to F/U outpatient with a PCP over the next two to three days.    Juan Benavides MD                          I, Juan Benavides, personally performed the services described in this documentation. All medical record entries made by the scribe were at my direction and in my presence.  I have reviewed the chart and agree that the record reflects my personal performance and is accurate and complete.                  Clinical Impression:       ICD-10-CM ICD-9-CM   1. Sore throat J02.9 462                             Juan Benavides MD  11/12/19 2017

## 2019-12-11 ENCOUNTER — HOSPITAL ENCOUNTER (EMERGENCY)
Facility: HOSPITAL | Age: 19
Discharge: HOME | End: 2019-12-11
Attending: EMERGENCY MEDICINE
Payer: MEDICAID

## 2019-12-11 VITALS
BODY MASS INDEX: 23.52 KG/M2 | TEMPERATURE: 99 F | HEIGHT: 71 IN | HEART RATE: 81 BPM | OXYGEN SATURATION: 99 % | WEIGHT: 168 LBS | SYSTOLIC BLOOD PRESSURE: 115 MMHG | RESPIRATION RATE: 18 BRPM | DIASTOLIC BLOOD PRESSURE: 60 MMHG

## 2019-12-11 DIAGNOSIS — S39.012A STRAIN OF LUMBAR REGION, INITIAL ENCOUNTER: Primary | ICD-10-CM

## 2019-12-11 DIAGNOSIS — M54.50 ACUTE RIGHT-SIDED LOW BACK PAIN WITHOUT SCIATICA: ICD-10-CM

## 2019-12-11 PROCEDURE — 25000003 PHARM REV CODE 250: Performed by: NURSE PRACTITIONER

## 2019-12-11 PROCEDURE — 99284 EMERGENCY DEPT VISIT MOD MDM: CPT

## 2019-12-11 RX ORDER — CYCLOBENZAPRINE HCL 10 MG
10 TABLET ORAL 3 TIMES DAILY PRN
Qty: 21 TABLET | Refills: 0 | Status: SHIPPED | OUTPATIENT
Start: 2019-12-11 | End: 2019-12-21

## 2019-12-11 RX ORDER — MAG HYDROX/ALUMINUM HYD/SIMETH 200-200-20
30 SUSPENSION, ORAL (FINAL DOSE FORM) ORAL
Status: COMPLETED | OUTPATIENT
Start: 2019-12-11 | End: 2019-12-11

## 2019-12-11 RX ORDER — IBUPROFEN 200 MG
600 TABLET ORAL EVERY 6 HOURS PRN
Qty: 30 TABLET | Refills: 0 | Status: SHIPPED | OUTPATIENT
Start: 2019-12-11

## 2019-12-11 RX ORDER — IBUPROFEN 600 MG/1
600 TABLET ORAL
Status: COMPLETED | OUTPATIENT
Start: 2019-12-11 | End: 2019-12-11

## 2019-12-11 RX ADMIN — ALUMINUM HYDROXIDE, MAGNESIUM HYDROXIDE, AND SIMETHICONE 30 ML: 200; 200; 20 SUSPENSION ORAL at 12:12

## 2019-12-11 RX ADMIN — IBUPROFEN 600 MG: 600 TABLET, FILM COATED ORAL at 12:12

## 2019-12-11 NOTE — ED TRIAGE NOTES
Pt arrived to the ED via POV from home with c/o back pain. Reports lower right back pain for approx the past x4 days after heavy lifting at work. Denies trauma. Rates pain 8/10 on pain scale. Also c/o urinary hesitancy

## 2019-12-11 NOTE — DISCHARGE INSTRUCTIONS
Try to get plenty of rest and stay well-hydrated on these medications.  Try to avoid long periods of standing, sitting, heavy lifting, or straining until your back pain resolves.    You can use cool or warm compresses over the affected area for relief.    Take ibuprofen for pain and inflammation. You can take flexeril as a muscle relaxant up to three times daily or before bedtime.  Please do not take flexeril while working, drinking alcohol, driving/operating heavy machinery, swimming. It may cause drowsiness, impair judgment, and reduce physical capabilities.    Follow up with your primary care doctor in 1 week if symptoms have not significantly improved. If you do not have a regular doctor, you can make an appointment with the primary care doctor listed on these discharge papers, or refer to our community resources page for nearby clinics.    Return to ER for any new or worsening symptoms.

## 2019-12-11 NOTE — ED PROVIDER NOTES
Encounter Date: 12/11/2019    SCRIBE #1 NOTE: I, Sarbjit Gallego, am scribing for, and in the presence of,  Rozina Terrazas NP. I have scribed the following portions of the note - Other sections scribed: HPI, ROS.       History     Chief Complaint   Patient presents with    Back Pain     pt lifting up stuff at work 4 days ago and now has lower back pain     CC: Back pain    HPI: This is a 19 y.o. M who has a Cardiac murmur who presents to the ED for emergent evaluation of acute back pain that began 4 days ago. Pt reports right lower back pain and lower mid back pain. Back pain is exacerbated with certain movement. 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 He has been intermittently taking Ibuprofen without much relief. Pt denies fever, cough, wheezing, CP, SOB, abdominal pain, nausea, vomiting, diarrhea, difficulty urinating, or dysuria.    The history is provided by the patient. No  was used.     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 No pertinent surgical history.  History reviewed. No pertinent family history.  Social History     Tobacco Use    Smoking status: Current Every Day Smoker     Types: Cigars    Smokeless tobacco: Never Used    Tobacco comment: pt. reports he stopped smoning black and milds about 2 weeks ago ( 7/20/19)   Substance Use Topics    Alcohol use: Never     Frequency: Never    Drug use: Never     Review of Systems   Constitutional: Negative for fever.   HENT: Negative for sore throat.    Respiratory: Negative for cough, shortness of breath and wheezing.    Cardiovascular: Negative for chest pain.   Gastrointestinal: Negative for abdominal pain, diarrhea and nausea.   Genitourinary: Negative for difficulty urinating and dysuria.   Musculoskeletal: Positive for back pain.   Skin:  Negative for rash.   Neurological: Negative for weakness.   Hematological: Does not bruise/bleed easily.       Physical Exam     Initial Vitals [12/11/19 1141]   BP Pulse Resp Temp SpO2   133/74 77 18 98.7 °F (37.1 °C) 99 %      MAP       --         Physical Exam    Nursing note and vitals reviewed.  Constitutional: Vital signs are normal. He appears well-developed and well-nourished. He is not diaphoretic. He is active and cooperative.  Non-toxic appearance. No distress.   Pulmonary/Chest: No respiratory distress.   Musculoskeletal:        Back:    Mild tenderness over the right thoracic and lumbar paraspinous musculature without midline tenderness or pain.   Neurological: He is alert and oriented to person, place, and time. He has normal strength. No sensory deficit. Coordination and gait normal.         ED Course   Procedures  Labs Reviewed - No data to display       Imaging Results    None          Medical Decision Making:   ED Management:  This is an urgent evaluation of a 18 y/o male that presents to the ER c/o atraumatic back pain.  Exam is benign, with tenderness over the paraspinous musculature alone.  Findings c/w a musculoskeletal strain.  There are no signs of saddle anesthesia, incontinence, neurologic deficits, fevers, trauma or midline tenderness on history or physical to suggest cauda equina, infectious process, fracture or subluxation.  Will give NSAIDs and muscle relaxants for relief.  To follow up with PMD for further evaluation of symptoms, or return to ER if condition persists, or for any other concerns.                 I, Rozina Terrazas, personally performed the services described in this documentation. All medical record entries made by the scribe were at my direction and in my presence. I have reviewed the chart and agree that the record reflects my personal performance and is accurate and complete.                   Clinical Impression:       ICD-10-CM ICD-9-CM   1. Strain of lumbar region,  initial encounter S39.012A 847.2   2. Acute right-sided low back pain without sciatica M54.5 724.2         Disposition:   Disposition: Discharged  Condition: Stable                     Rozina Terrazas NP  12/12/19 1393

## 2019-12-12 ENCOUNTER — HOSPITAL ENCOUNTER (EMERGENCY)
Facility: HOSPITAL | Age: 19
Discharge: HOME | End: 2019-12-12
Attending: EMERGENCY MEDICINE
Payer: MEDICAID

## 2019-12-12 VITALS
DIASTOLIC BLOOD PRESSURE: 69 MMHG | WEIGHT: 168 LBS | HEIGHT: 71 IN | TEMPERATURE: 100 F | SYSTOLIC BLOOD PRESSURE: 129 MMHG | RESPIRATION RATE: 19 BRPM | BODY MASS INDEX: 23.52 KG/M2 | OXYGEN SATURATION: 97 % | HEART RATE: 80 BPM

## 2019-12-12 DIAGNOSIS — M54.41 ACUTE RIGHT-SIDED LOW BACK PAIN WITH RIGHT-SIDED SCIATICA: Primary | ICD-10-CM

## 2019-12-12 LAB
BILIRUB UR QL STRIP: NEGATIVE
CLARITY UR: CLEAR
COLOR UR: YELLOW
GLUCOSE UR QL STRIP: NEGATIVE
HGB UR QL STRIP: NEGATIVE
KETONES UR QL STRIP: NEGATIVE
LEUKOCYTE ESTERASE UR QL STRIP: NEGATIVE
NITRITE UR QL STRIP: NEGATIVE
PH UR STRIP: 7 [PH] (ref 5–8)
PROT UR QL STRIP: NEGATIVE
SP GR UR STRIP: 1.01 (ref 1–1.03)
URN SPEC COLLECT METH UR: NORMAL
UROBILINOGEN UR STRIP-ACNC: NEGATIVE EU/DL

## 2019-12-12 PROCEDURE — 81003 URINALYSIS AUTO W/O SCOPE: CPT

## 2019-12-12 PROCEDURE — 25000003 PHARM REV CODE 250: Performed by: NURSE PRACTITIONER

## 2019-12-12 PROCEDURE — 99284 EMERGENCY DEPT VISIT MOD MDM: CPT | Mod: 25

## 2019-12-12 PROCEDURE — 96372 THER/PROPH/DIAG INJ SC/IM: CPT

## 2019-12-12 PROCEDURE — 63600175 PHARM REV CODE 636 W HCPCS: Performed by: NURSE PRACTITIONER

## 2019-12-12 RX ORDER — LIDOCAINE 50 MG/G
1 PATCH TOPICAL
Status: DISCONTINUED | OUTPATIENT
Start: 2019-12-12 | End: 2019-12-12 | Stop reason: HOSPADM

## 2019-12-12 RX ORDER — ORPHENADRINE CITRATE 100 MG/1
100 TABLET, EXTENDED RELEASE ORAL
Status: DISCONTINUED | OUTPATIENT
Start: 2019-12-12 | End: 2019-12-12 | Stop reason: HOSPADM

## 2019-12-12 RX ORDER — KETOROLAC TROMETHAMINE 30 MG/ML
30 INJECTION, SOLUTION INTRAMUSCULAR; INTRAVENOUS
Status: COMPLETED | OUTPATIENT
Start: 2019-12-12 | End: 2019-12-12

## 2019-12-12 RX ADMIN — LIDOCAINE 1 PATCH: 50 PATCH TOPICAL at 09:12

## 2019-12-12 RX ADMIN — KETOROLAC TROMETHAMINE 30 MG: 30 INJECTION, SOLUTION INTRAMUSCULAR; INTRAVENOUS at 08:12

## 2019-12-13 NOTE — DISCHARGE INSTRUCTIONS
Thank you for allowing me to care for you today.  I hope our treatment plan will make you feel better in the next few days.  In order for me to take better care of my future patients and improve our Emergency Department, I would appreciate if you can provide us with feedback.  In the next few days, you may receive a survey in the mail.  If you do, it would mean a great deal to me if you would please take the time to complete it.    Thank you and I hope you feel better.  Verito Pizarro NP

## 2019-12-13 NOTE — ED PROVIDER NOTES
Encounter Date: 12/12/2019    SCRIBE #1 NOTE: I, Сергей Zarate, am scribing for, and in the presence of,  Verito Pizarro NP. I have scribed the following portions of the note - Other sections scribed: HPI, ROS.       History     Chief Complaint   Patient presents with    Back Pain     back pain x 5 days from lifting at work.  right lower back initially then moved to legs and up towards neck.  pt took Ibuprofen last dose at 12n today     CC: Back Pain    HPI:  This is a 19 y.o. male with no pertinent PMHx who presents to the ED for evaluation of constant severe (9/10) R lower back pain for past 5 days.  Pt was recently evaluated for this yesterday, given Ibuprofen, and sent home on Flexiril.  He did not get his prescriptions filled because he states that he cannot afford them.  He reports worsening pain since this morning and states his pain newly shoots down to R hip.  Had attempted treatment by taking Ibuprofen though without relief.  He also c/o some decreased urinary frequency for past few days and states he has not urinated today yet; last urinated yesterday.  He denies h/o similar symptoms.  Denies any dysuria, hematuria.  No numbness, tingling, bowel incontinence, other associated symptoms.  No fever, weight changes.  Denies personal h/o CA, kidney infection, kidney stones, or family h/o kidney stones.  No h/o IV drug usage.  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      The history is provided by the patient and medical records. No  was used.     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 No pertinent surgical history.  History reviewed. No pertinent family history.  Social History     Tobacco Use    Smoking status: Current Every Day Smoker     Types: Cigars    Smokeless tobacco: Never Used     Tobacco comment: pt. reports he stopped smoning black and milds about 2 weeks ago ( 7/20/19)   Substance Use Topics    Alcohol use: Never     Frequency: Never    Drug use: Never     Review of Systems   Constitutional: Negative for fever and unexpected weight change.   HENT: Negative for sore throat.    Eyes: Negative for visual disturbance.   Respiratory: Negative for shortness of breath.    Cardiovascular: Negative for chest pain.   Gastrointestinal: Negative for abdominal pain.        (-) Bowel incontinence.   Genitourinary: Positive for frequency (decreased). Negative for dysuria and hematuria.   Musculoskeletal: Positive for back pain.   Skin: Negative for rash.   Neurological: Negative for numbness and headaches.       Physical Exam     Initial Vitals [12/12/19 2026]   BP Pulse Resp Temp SpO2   131/70 80 19 99.5 °F (37.5 °C) 97 %      MAP       --         Physical Exam    Nursing note and vitals reviewed.  Constitutional: He appears well-developed and well-nourished.  Non-toxic appearance. No distress.   The patient is alert, oriented, and speaking in complete sentences.  No apparent distress.   HENT:   Head: Normocephalic and atraumatic.   Right Ear: Hearing and external ear normal.   Left Ear: Hearing and external ear normal.   Nose: Nose normal.   Mouth/Throat: Mucous membranes are normal.   Eyes: Conjunctivae and EOM are normal.   Neck: Full passive range of motion without pain. Neck supple.   Cardiovascular: Normal rate and normal pulses.   Pulmonary/Chest: Effort normal and breath sounds normal. No respiratory distress. He has no wheezes. He has no rhonchi. He has no rales.   Musculoskeletal: Normal range of motion.        Lumbar back: He exhibits tenderness. He exhibits no bony tenderness and no deformity.        Back:    No midline tenderness or step-off appreciated on exam   Neurological: He is alert and oriented to person, place, and time. He has normal strength. Gait normal. GCS eye subscore is 4.  GCS verbal subscore is 5. GCS motor subscore is 6.   Positive right-sided straight leg test   Skin: Skin is warm, dry and intact. Capillary refill takes less than 2 seconds. No rash noted.   Psychiatric: He has a normal mood and affect. His speech is normal and behavior is normal. Judgment and thought content normal. Cognition and memory are normal.         ED Course   Procedures  Labs Reviewed   URINALYSIS, REFLEX TO URINE CULTURE    Narrative:     Preferred Collection Type->Urine, Clean Catch          Imaging Results          X-Ray Lumbar Spine Ap And Lateral (Final result)  Result time 12/12/19 20:43:53    Final result by Terrance Kimble MD (12/12/19 20:43:53)                 Impression:      1. No acute displaced fracture or dislocation of the lumbar spine.      Electronically signed by: Terrance Kimble MD  Date:    12/12/2019  Time:    20:43             Narrative:    EXAMINATION:  XR LUMBAR SPINE AP AND LATERAL    CLINICAL HISTORY:  Low back pain, <6wks, no red flags, no prior management;    TECHNIQUE:  AP, lateral and spot images were performed of the lumbar spine.    COMPARISON:  None    FINDINGS:  Three views.    Lateral imaging demonstrates adequate alignment of the lumbar spine without significant vertebral body height loss or disc space height loss.  The facet joints are aligned.  AP spinal alignment is unremarkable.  The sacral segments are grossly aligned.  AP spinal alignment is unremarkable noting mild levo scoliotic curvature.  The sacroiliac joints are intact.                                 Medical Decision Making:   History:   Old Medical Records: I decided to obtain old medical records.  Clinical Tests:   Lab Tests: Ordered and Reviewed  Radiological Study: Ordered and Reviewed  ED Management:  This is an emergent evaluation of a 19-year-old male who presents to the ED complaining of a 5-day history of right lower back pain. Patient was seen yesterday for the same complaints.  States that his  symptoms have worsened since yesterday and now pain is shooting down my right hip.    Physical exam is notable for right-sided, muscular tenderness to palpation. No midline tenderness on exam.  Positive right-sided straight leg test.  Exam is otherwise unremarkable.    No acute abnormalities noted on plain film of lumbar spine.    Patient able to provide a urine specimen without difficulty.  UA is clear.    Based on history and physical exam, as well as diagnostic results, I considered but do not suspect cystitis, pyelonephritis, ureterolithiasis, cauda equina, fracture, or subluxation.  Patient will be discharged and instructed to fill prescriptions that were provided yesterday.  I have also instructed him to follow up regarding his Medicaid health insurance.  All questions answered.  Return precautions have been discussed.                                 Clinical Impression:       ICD-10-CM ICD-9-CM   1. Acute right-sided low back pain with right-sided sciatica M54.41 724.2     724.3     I, WANDA Bermudez, LESLIEP-BC, AGACNP-BC, personally performed the services described in this documentation. All medical record entries made by the scribe were at my direction and in my presence. I have reviewed the chart and agree that the record reflects my personal performance and is accurate and complete. WANDA Bermudez, BEV-BC, ROROP-BC 10:01 PM 12/12/2019                        Verito Pizarro NP  12/12/19 5569

## 2019-12-13 NOTE — ED TRIAGE NOTES
Pt presents to ED with back pain x 5 days 9/10. Pt denies injury or fall but reports heavy lifting at work. Pt states that pain started in back put now radiates to legs and neck. Report vomiting earlier today.  Reports taking Ibuprofen this AM with no relief. NAD noted.

## 2019-12-31 ENCOUNTER — HOSPITAL ENCOUNTER (EMERGENCY)
Facility: HOSPITAL | Age: 19
Discharge: HOME | End: 2019-12-31
Attending: EMERGENCY MEDICINE
Payer: MEDICAID

## 2019-12-31 VITALS
RESPIRATION RATE: 18 BRPM | DIASTOLIC BLOOD PRESSURE: 72 MMHG | HEART RATE: 67 BPM | WEIGHT: 162 LBS | TEMPERATURE: 99 F | BODY MASS INDEX: 22.68 KG/M2 | OXYGEN SATURATION: 98 % | SYSTOLIC BLOOD PRESSURE: 119 MMHG | HEIGHT: 71 IN

## 2019-12-31 DIAGNOSIS — B34.9 VIRAL SYNDROME: Primary | ICD-10-CM

## 2019-12-31 PROCEDURE — 99284 EMERGENCY DEPT VISIT MOD MDM: CPT

## 2019-12-31 PROCEDURE — 63600175 PHARM REV CODE 636 W HCPCS: Performed by: PHYSICIAN ASSISTANT

## 2019-12-31 RX ORDER — PROMETHAZINE HYDROCHLORIDE AND DEXTROMETHORPHAN HYDROBROMIDE 6.25; 15 MG/5ML; MG/5ML
5 SYRUP ORAL NIGHTLY PRN
Qty: 120 ML | Refills: 0 | Status: SHIPPED | OUTPATIENT
Start: 2019-12-31 | End: 2020-01-10

## 2019-12-31 RX ORDER — DEXAMETHASONE SODIUM PHOSPHATE 4 MG/ML
12 INJECTION, SOLUTION INTRA-ARTICULAR; INTRALESIONAL; INTRAMUSCULAR; INTRAVENOUS; SOFT TISSUE
Status: COMPLETED | OUTPATIENT
Start: 2019-12-31 | End: 2019-12-31

## 2019-12-31 RX ORDER — AZELASTINE 1 MG/ML
1 SPRAY, METERED NASAL 2 TIMES DAILY
Qty: 30 ML | Refills: 0 | Status: SHIPPED | OUTPATIENT
Start: 2019-12-31 | End: 2020-12-30

## 2019-12-31 RX ADMIN — DEXAMETHASONE SODIUM PHOSPHATE 12 MG: 4 INJECTION, SOLUTION INTRA-ARTICULAR; INTRALESIONAL; INTRAMUSCULAR; INTRAVENOUS; SOFT TISSUE at 02:12

## 2019-12-31 NOTE — ED TRIAGE NOTES
Pt here for sore throat for the past few days. Attempted treatment with OTC meds; no relief. Denies fever, n/v/d.

## 2019-12-31 NOTE — DISCHARGE INSTRUCTIONS
Drink lots of fluids, stay well hydrated. Tylenol/Ibuprofen as needed for discomfort; go back and forth between these two medications every 4 hrs as needed for temp greater than or equal to 100.4F, as needed for congestion/headache/body aches. Astelin for congestion. Robitussin DM or cough. Phenergan DM for cough in the evenings. Be aware, this medication is sedating.  Do not mix with alcohol or any other sedating medications.  Do not drive or operate machinery when taking this medication.     Follow-up with primary care provider for reevaluation, further recommendations. Return to this ED if unable to treat fever, if symptoms persist or worsen despite treatment, if you begin with shortness of breath or difficulty breathing, if any other problems occur.

## 2019-12-31 NOTE — ED PROVIDER NOTES
Encounter Date: 12/31/2019       History     Chief Complaint   Patient presents with    Sore Throat     since Thursday. no relief with OTC meds. denies fever      20yo M with chief complaint cough infrequent productive of yellow sputum, nasal congestion, rhinorrhea, odynophagia without dysphagia, all x5 days.  No fever chills. No myalgias.  No neck pain or stiffness. Bilateral otalgia began today.  No relief with over-the-counter medications.  No recent illness or sick contacts.  Symptoms are acute, constant, severity 5/10.  No alleviating or exacerbating factors.  No radiation symptoms. Denies chest pain or shortness of breath.        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  No family history on file.  Social History     Tobacco Use    Smoking status: Current Every Day Smoker     Types: Cigars    Smokeless tobacco: Never Used    Tobacco comment: pt. reports he stopped smoning black and milds about 2 weeks ago ( 7/20/19)   Substance Use Topics    Alcohol use: Never     Frequency: Never    Drug use: Never     Review of Systems   Constitutional: Negative for chills and fever.   HENT: Positive for congestion, ear pain, rhinorrhea and sore throat. Negative for ear discharge and nosebleeds.    Eyes: Negative for discharge and redness.   Respiratory: Positive for cough. Negative for chest tightness and shortness of breath.    Cardiovascular: Negative for chest pain and leg swelling.   Gastrointestinal: Negative for abdominal pain, nausea and vomiting.   Genitourinary: Negative for dysuria, penile pain and testicular pain.   Musculoskeletal: Negative for back pain, myalgias, neck pain and neck stiffness.   Skin: Negative for rash.   Neurological: Negative for dizziness, weakness, light-headedness and headaches.   Hematological: Does not bruise/bleed easily.   All  other systems reviewed and are negative.      Physical Exam     Initial Vitals [12/31/19 0033]   BP Pulse Resp Temp SpO2   132/78 61 15 98.2 °F (36.8 °C) 99 %      MAP       --         Physical Exam    Nursing note and vitals reviewed.  Constitutional: He appears well-developed and well-nourished. He is not diaphoretic. No distress.   HENT:   Head: Normocephalic and atraumatic.   Nasal congestion, clear rhinorrhea.    There is posterior oropharyngeal erythema without tonsillar swelling, exudate, asymmetry. No uvular deviation.  There is bilateral anterior cervical lymphadenopathy.  Neck remains supple. No oropharyngeal edema, airway is patent without stridor.   Eyes: Conjunctivae and EOM are normal. Pupils are equal, round, and reactive to light.   Neck: Normal range of motion. Neck supple.   Cardiovascular: Intact distal pulses.   Normal sinus rhythm. No pretibial edema.   Pulmonary/Chest: Breath sounds normal. No respiratory distress. He has no wheezes.   Musculoskeletal: Normal range of motion. He exhibits no tenderness.   Lymphadenopathy:     He has no cervical adenopathy.   Neurological: He is alert and oriented to person, place, and time. He has normal strength.   Skin: Skin is warm and dry. Capillary refill takes less than 2 seconds. No rash and no abscess noted. No erythema.   Psychiatric: He has a normal mood and affect. His behavior is normal. Judgment and thought content normal.         ED Course   Procedures  Labs Reviewed - No data to display       Imaging Results    None          Medical Decision Making:   Differential Diagnosis:   Viral illness, pneumonia, bronchitis, pharyngitis  ED Management:  Suspect viral illness given constellation of symptoms.  Decadron for comfort.  PCP follow-up.    Centor score 1.                                  Clinical Impression:       ICD-10-CM ICD-9-CM   1. Viral syndrome B34.9 079.99         Disposition:   Disposition: Discharged  Condition: Stable                             Matt Rdz PA-C  12/31/19 0221

## 2020-01-01 ENCOUNTER — HOSPITAL ENCOUNTER (EMERGENCY)
Facility: HOSPITAL | Age: 20
Discharge: HOME | End: 2020-01-01
Attending: EMERGENCY MEDICINE
Payer: MEDICAID

## 2020-01-01 VITALS
WEIGHT: 162 LBS | OXYGEN SATURATION: 98 % | HEIGHT: 71 IN | SYSTOLIC BLOOD PRESSURE: 129 MMHG | HEART RATE: 91 BPM | BODY MASS INDEX: 22.68 KG/M2 | TEMPERATURE: 99 F | DIASTOLIC BLOOD PRESSURE: 70 MMHG | RESPIRATION RATE: 20 BRPM

## 2020-01-01 DIAGNOSIS — R05.9 COUGH: ICD-10-CM

## 2020-01-01 DIAGNOSIS — B34.9 VIRAL SYNDROME: Primary | ICD-10-CM

## 2020-01-01 LAB
CTP QC/QA: YES
DEPRECATED S PYO AG THROAT QL EIA: NEGATIVE
POC MOLECULAR INFLUENZA A AGN: NEGATIVE
POC MOLECULAR INFLUENZA B AGN: NEGATIVE

## 2020-01-01 PROCEDURE — 99284 EMERGENCY DEPT VISIT MOD MDM: CPT | Mod: 25

## 2020-01-01 PROCEDURE — 25000003 PHARM REV CODE 250: Performed by: NURSE PRACTITIONER

## 2020-01-01 PROCEDURE — 87502 INFLUENZA DNA AMP PROBE: CPT

## 2020-01-01 PROCEDURE — 87081 CULTURE SCREEN ONLY: CPT

## 2020-01-01 PROCEDURE — 87880 STREP A ASSAY W/OPTIC: CPT

## 2020-01-01 RX ORDER — IBUPROFEN 600 MG/1
600 TABLET ORAL EVERY 6 HOURS PRN
Qty: 20 TABLET | Refills: 0 | Status: SHIPPED | OUTPATIENT
Start: 2020-01-01

## 2020-01-01 RX ORDER — FLUTICASONE PROPIONATE 50 MCG
1 SPRAY, SUSPENSION (ML) NASAL 2 TIMES DAILY PRN
Qty: 15 G | Refills: 0 | Status: SHIPPED | OUTPATIENT
Start: 2020-01-01

## 2020-01-01 RX ORDER — CETIRIZINE HYDROCHLORIDE 10 MG/1
10 TABLET ORAL DAILY
Qty: 30 TABLET | Refills: 0 | Status: SHIPPED | OUTPATIENT
Start: 2020-01-01

## 2020-01-01 RX ORDER — BENZONATATE 100 MG/1
100 CAPSULE ORAL 3 TIMES DAILY PRN
Qty: 20 CAPSULE | Refills: 0 | Status: SHIPPED | OUTPATIENT
Start: 2020-01-01

## 2020-01-01 RX ORDER — IBUPROFEN 600 MG/1
600 TABLET ORAL
Status: COMPLETED | OUTPATIENT
Start: 2020-01-01 | End: 2020-01-01

## 2020-01-01 RX ADMIN — IBUPROFEN 600 MG: 600 TABLET, FILM COATED ORAL at 01:01

## 2020-01-01 NOTE — ED TRIAGE NOTES
Patient arrived to ED with c/o productive cough, nasal congestion, sore throat, generalized aches, bilateral earache, and nausea x 6 days.  Denies fever, vomiting, or diarrhea.  No acute distress noted.

## 2020-01-01 NOTE — DISCHARGE INSTRUCTIONS
Follow-up with your regular doctor or the clinic listed on your discharge paperwork.    Take all medications as prescribed.    Return to the emergency department for any new or worsening symptoms.    Thank you for coming to our Emergency Department today. It is important to remember that some problems are difficult to diagnose and may not be found during your first visit. Be sure to follow up with your primary care doctor.  If you do not have one, you may contact the one listed on your discharge paperwork or you may also call the [facility] Appointment Desk at [phone] to schedule an appointment with one.     Return to the ER with any questions/concerns, new/concerning symptoms, worsening or failure to improve. Do not drive or make any important decisions for 24 hours if you have received any pain medications, sedatives or mood altering drugs during your ER visit.

## 2020-01-03 LAB — BACTERIA THROAT CULT: NORMAL

## 2020-03-13 ENCOUNTER — HOSPITAL ENCOUNTER (EMERGENCY)
Facility: HOSPITAL | Age: 20
Discharge: HOME | End: 2020-03-13
Attending: EMERGENCY MEDICINE
Payer: MEDICAID

## 2020-03-13 ENCOUNTER — PATIENT OUTREACH (OUTPATIENT)
Dept: EMERGENCY MEDICINE | Facility: HOSPITAL | Age: 20
End: 2020-03-13

## 2020-03-13 VITALS
DIASTOLIC BLOOD PRESSURE: 64 MMHG | RESPIRATION RATE: 18 BRPM | BODY MASS INDEX: 24.64 KG/M2 | SYSTOLIC BLOOD PRESSURE: 126 MMHG | TEMPERATURE: 98 F | OXYGEN SATURATION: 98 % | HEART RATE: 68 BPM | WEIGHT: 176 LBS | HEIGHT: 71 IN

## 2020-03-13 DIAGNOSIS — J06.9 VIRAL URI WITH COUGH: Primary | ICD-10-CM

## 2020-03-13 DIAGNOSIS — R05.9 COUGH: ICD-10-CM

## 2020-03-13 LAB
CTP QC/QA: YES
POC MOLECULAR INFLUENZA A AGN: NEGATIVE
POC MOLECULAR INFLUENZA B AGN: NEGATIVE

## 2020-03-13 PROCEDURE — 99283 EMERGENCY DEPT VISIT LOW MDM: CPT | Mod: 25

## 2020-03-13 PROCEDURE — 87502 INFLUENZA DNA AMP PROBE: CPT

## 2020-03-13 NOTE — ED PROVIDER NOTES
"Encounter Date: 3/13/2020    SCRIBE #1 NOTE: I, Brisa Elaine, am scribing for, and in the presence of,  Breann James PA-C. I have scribed the following portions of the note - Other sections scribed: HPI, ROS.       History     Chief Complaint   Patient presents with    Cough     "I literally coughed once and my boss told me I had to come get checked out before I clock in to work" No distress      19 year old male patient with a past medical history of asthma presents to the ED complaining of productive cough onset yesterday. He also reports mild shortness of breath, nasal congestion, sore throat, and myalgias. He denies any chills, fever, nausea, diarrhea, or emesis. He denies any recent sick contacts, travel, or contact with anyone known to have COVID-19.    The history is provided by the patient.     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 No pertinent surgical history.  History reviewed. No pertinent family history.  Social History     Tobacco Use    Smoking status: Current Every Day Smoker     Types: Cigars    Smokeless tobacco: Never Used    Tobacco comment: pt. reports he stopped smoning black and milds about 2 weeks ago ( 7/20/19)   Substance Use Topics    Alcohol use: Never     Frequency: Never    Drug use: Never     Review of Systems   Constitutional: Negative for chills and fever.   HENT: Positive for congestion (Nasal) and sore throat.    Respiratory: Positive for cough (Productive) and shortness of breath.    Gastrointestinal: Negative for diarrhea, nausea and vomiting.   Musculoskeletal: Positive for myalgias.   All other systems reviewed and are negative.      Physical Exam     Initial Vitals [03/13/20 0825]   BP Pulse Resp Temp SpO2   133/79 91 18 98.1 °F (36.7 °C) 98 %      MAP       --         Physical Exam    Nursing note and vitals " reviewed.  Constitutional: He appears well-developed and well-nourished. He is not diaphoretic. No distress.   HENT:   Head: Normocephalic and atraumatic.   Right Ear: External ear normal.   Left Ear: External ear normal.   Nose: Nose normal.   Mouth/Throat: Oropharynx is clear and moist.   Eyes: EOM are normal. Pupils are equal, round, and reactive to light.   Neck: Normal range of motion. Neck supple.   Cardiovascular: Normal rate.   Pulmonary/Chest: Breath sounds normal. No respiratory distress. He has no wheezes. He has no rhonchi. He has no rales.   Abdominal: Soft. Bowel sounds are normal. He exhibits no distension. There is no tenderness. There is no rebound and no guarding.   Musculoskeletal: Normal range of motion. He exhibits no edema or tenderness.   Neurological: He is alert and oriented to person, place, and time.   Skin: Skin is warm. Capillary refill takes less than 2 seconds.         ED Course   Procedures  Labs Reviewed   POCT INFLUENZA A/B MOLECULAR          Imaging Results          X-Ray Chest PA And Lateral (Final result)  Result time 03/13/20 09:29:36    Final result by Nitza Benitez MD (03/13/20 09:29:36)                 Impression:      Normal chest radiograph      Electronically signed by: Nitza Benitez MD  Date:    03/13/2020  Time:    09:29             Narrative:    EXAMINATION:  XR CHEST PA AND LATERAL    CLINICAL HISTORY:  Cough    TECHNIQUE:  PA and lateral views of the chest were performed.    COMPARISON:  01/01/2020    FINDINGS:  The cardiac silhouette is normal in size.  The pulmonary vascularity is normal.  The lungs are clear and well inflated.  Surgical staples overlie the patient's skin posteriorly possibly associated with a shirt or gown.  The osseous structures appear normal.                              X-Rays:   Independently Interpreted Readings:   Other Readings:  Chest x-ray reveals no acute cardiopulmonary processes.          APC / Resident Notes:   This is a  19-year-old male who presents to the emergency department complaining of cough.  He went to work and was sent home due to coughing while at work.  His boss states that these be evaluated by  before returning to work.    Previous medical records were obtained and reviewed.  The patient has a history of asthma.    The patient is currently afebrile and nontoxic in appearance.  His vital signs are stable.  On physical exam, the bilateral lungs are clear to auscultation without wheezes, rales or rhonchi.  There is no evidence of pneumonia.  The bilateral tympanic membranes are clear and intact.  There is no evidence of otitis media, strep pharyngitis, meningitis.  The remaining physical exam is unremarkable.  A rapid influenza was performed and was negative.  A chest x-ray was performed which revealed no acute cardiopulmonary processes.  I do not believe that this patient has pneumonia.  He denies any CoVID 19 exposure.  I believe this patient has a viral URI that I will encourage to treat symptomatically.  Signs and symptoms of worsening were thoroughly reviewed with him the verbalized understanding and agreement.  He is currently safe and stable for discharge at this time with PCP follow-up.       Scribe Attestation:   Scribe #1: I performed the above scribed service and the documentation accurately describes the services I performed. I attest to the accuracy of the note.                          Clinical Impression:       ICD-10-CM ICD-9-CM   1. Viral URI with cough J06.9 465.9    B97.89    2. Cough R05 786.2         Disposition:   Disposition: Discharged  Condition: Stable     ED Disposition Condition    Discharge Stable        ED Prescriptions     None        Follow-up Information    None                       I, Breann James PA-C, personally performed the services described in this documentation. All medical record entries made by the scribe were at my direction and in my presence. I have reviewed the chart  and agree that the record reflects my personal performance and is accurate and complete.             Brenan James PA-C  03/13/20 2022

## 2020-03-13 NOTE — ED TRIAGE NOTES
"Pt. C/o of productive cough, sore throat, headache, and generalized body aches beginning yesterday afternoon. Pt. States he "feels light headed". Pain 5/10. Pt. Reports taking tylenol last night. Denies taking meds prior to arrival.  "

## 2020-03-13 NOTE — DISCHARGE INSTRUCTIONS
You may take over-the-counter Tylenol or Motrin for body aches.  Rest.  You should not go to work until you are symptom free.  Please follow-up with your primary care physician.  If your symptoms worsen in any way you may go to the [facility] testing Center with the information that was provided to you upon discharge.  Rest.

## 2020-03-13 NOTE — PROGRESS NOTES
Lorene Goodrich  ED Navigator  Emergency Department    [place] [facility] [place]  Role: Community Health Worker    Date: 03/13/2020  Patient Name: Wilfrido Nguyen  MRN: [id number]  PCP: Primary Doctor No    Assessment:     Wilfrido Nguyen is a 19 y.o. male who has presented to ED for No chief complaint on file.  . Patient has visited the ED 3 times in the past 3 months. Patient did no contact tPCP.     ED Navigator Patient Assessment    Consent to Services  Does patient consent to completing the assessment?:  Yes  Transportation  Does the patient have issues with Transportation?:  Yes (Comment: Patient states family or friends can help just not all the time.)  Can family, friends, or others help?:  Yes  Lack of transportation to appts, pharmacy, etc.?:  Yes  What type of assistance is needed?:  Standard (RTA/MITS)  What is available in their region?:  Public transportaton.  Insurance Coverage  Do you have coverage/adequate coverage?:  Yes  Type/kind of coverage:  Medicaid  Is patient able to afford co-pays/deductibles?:  Yes  Is patient able to afford HME or supplies?:  Yes  Does patient have an established [facility] PCP?:  No  Does patient need assistance finding a PCP?:  Yes (Comment: Patient will be mailed resources for [facility] and [facility] PCP line ([phone]))  Does the patient have a lack of adequate coverage?:  No  Specialist Appointment  Did the patient come to the ED to see a specialist?:  No  Does the patient have a pending specialist referral?:  No  Does the patient have a specialist appointment made?:  No  PCP Follow Up Appointment  Does the patient have a follow up appontment with their PCP?:  No  Why does the patient not have a follow up scheduled?:  No established [facility]/outside PCP (Comment: Patient states he does not have a PCP. He will reach out to his medical coverage and will be mailed information to establish care with a PCP-[facility], & [facility] PCP line)  Would you like an [facility] PCP?:  No  Medications  Is  patient able to afford medication?:  Yes  Is patient unable to get medication due to lack of transportation?:  No  Psychological  Does the patient have psycho-social concerns?:  No  Food  Does the patient have concerns about food?:  No  Communication/Education  Does the patient have limited English proficiency/English not primary language?:  No  Does patient have low literacy and/or low health literacy?:  No  Does patient have concerns with care?:  No  Does patient have dissatisfaction with care?:  No  Other Financial Concers  Does the patient have immediate financial distress?:  No  Does the patient have general financial concerns?:  No  Other Social Barriers/Concerns  Does the patient have any additional barriers or concerns?:  Dental (Comment: Patient states he would like to get braces. Patient will be mailed dental resources for his region)         Social History     Socioeconomic History    Marital status: Single     Spouse name: Not on file    Number of children: Not on file    Years of education: Not on file    Highest education level: Not on file   Occupational History    Not on file   Social Needs    Financial resource strain: Not hard at all    Food insecurity:     Worry: Never true     Inability: Never true    Transportation needs:     Medical: No     Non-medical: No   Tobacco Use    Smoking status: Current Every Day Smoker     Types: Cigars    Smokeless tobacco: Never Used    Tobacco comment: pt. reports he stopped smoning black and milds about 2 weeks ago ( 7/20/19)   Substance and Sexual Activity    Alcohol use: Never     Frequency: Never    Drug use: Never    Sexual activity: Yes     Partners: Female   Lifestyle    Physical activity:     Days per week: 0 days     Minutes per session: 0 min    Stress: Not at all   Relationships    Social connections:     Talks on phone: Three times a week     Gets together: Twice a week     Attends Cheondoism service: Never     Active member of club or  organization: No     Attends meetings of clubs or organizations: Never     Relationship status: Not on file   Other Topics Concern    Not on file   Social History Narrative    Not on file       Plan:     Transportation Insecurity: Patient states family and friends normally take him to his appt., but they are not always available.  Patient states he does not have an established PCP and he would like dental resources to get braces.  Patient will be given/mailed resources for: [facility] 24/7 Nurse triage line, [facility] ([facility]Ex: [facility], [facility], etc.), dental resources, and [facility] PCP line ([phone]) to get assistance in establishing care with a PCP.    Appointment made with: No appt made. Patient will reach out to his coverage to see who has been assigned to him as his PCP.

## 2020-03-15 ENCOUNTER — OFFICE VISIT (OUTPATIENT)
Dept: URGENT CARE | Facility: CLINIC | Age: 20
End: 2020-03-15
Payer: MEDICAID

## 2020-03-15 VITALS
OXYGEN SATURATION: 100 % | SYSTOLIC BLOOD PRESSURE: 126 MMHG | HEART RATE: 61 BPM | BODY MASS INDEX: 24.69 KG/M2 | DIASTOLIC BLOOD PRESSURE: 83 MMHG | RESPIRATION RATE: 20 BRPM | WEIGHT: 176.38 LBS | HEIGHT: 71 IN | TEMPERATURE: 98 F

## 2020-03-15 DIAGNOSIS — R05.9 COUGH: Primary | ICD-10-CM

## 2020-03-15 PROCEDURE — 99203 OFFICE O/P NEW LOW 30 MIN: CPT | Mod: S$GLB,,, | Performed by: INTERNAL MEDICINE

## 2020-03-15 PROCEDURE — 99203 PR OFFICE/OUTPT VISIT, NEW, LEVL III, 30-44 MIN: ICD-10-PCS | Mod: S$GLB,,, | Performed by: INTERNAL MEDICINE

## 2020-03-15 RX ORDER — PROMETHAZINE HYDROCHLORIDE AND DEXTROMETHORPHAN HYDROBROMIDE 6.25; 15 MG/5ML; MG/5ML
5 SYRUP ORAL EVERY 4 HOURS PRN
Qty: 180 ML | Refills: 0 | Status: SHIPPED | OUTPATIENT
Start: 2020-03-15 | End: 2020-03-25 | Stop reason: ALTCHOICE

## 2020-03-15 NOTE — PROGRESS NOTES
"Subjective:       Patient ID: Wilfrido Nguyen is a 19 y.o. male.    Vitals:  height is 5' 11" (1.803 m) and weight is 80 kg (176 lb 5.9 oz). His respiration is 20.     Chief Complaint: URI    Patient complains of minor cough and SOB since Thursday. [phone]    URI    This is a new problem. The current episode started in the past 7 days. The problem has been unchanged. There has been no fever. Associated symptoms include coughing. Pertinent negatives include no chest pain, congestion, diarrhea, dysuria, headaches, nausea, rash, sore throat or vomiting. He has tried nothing for the symptoms. The treatment provided no relief.       Constitution: Negative for chills, fatigue and fever.   HENT: Negative for congestion and sore throat.    Neck: Negative for painful lymph nodes.   Cardiovascular: Negative for chest pain and leg swelling.   Eyes: Negative for double vision and blurred vision.   Respiratory: Positive for cough and shortness of breath.    Gastrointestinal: Negative for nausea, vomiting and diarrhea.   Genitourinary: Negative for dysuria, frequency and urgency.   Musculoskeletal: Negative for joint pain, joint swelling, muscle cramps and muscle ache.   Skin: Negative for color change, pale, rash and erythema.   Allergic/Immunologic: Negative for seasonal allergies.   Neurological: Negative for dizziness, history of vertigo, light-headedness, passing out and headaches.   Hematologic/Lymphatic: Negative for swollen lymph nodes, easy bruising/bleeding and history of blood clots. Does not bruise/bleed easily.   Psychiatric/Behavioral: Negative for nervous/anxious, sleep disturbance and depression. The patient is not nervous/anxious.        Objective:      Physical Exam   Constitutional: He is oriented to person, place, and time. He appears well-developed and well-nourished. No distress.   HENT:   Head: Normocephalic and atraumatic.   Right Ear: External ear normal.   Left Ear: External ear normal.   Nose: Nose " normal.   Mouth/Throat: Oropharynx is clear and moist. No oropharyngeal exudate.   Eyes: Pupils are equal, round, and reactive to light. Conjunctivae and EOM are normal. Right eye exhibits no discharge. Left eye exhibits no discharge. No scleral icterus.   Neck: Normal range of motion. Neck supple.   Cardiovascular: Normal rate, regular rhythm, normal heart sounds and intact distal pulses. Exam reveals no gallop and no friction rub.   No murmur heard.  Pulmonary/Chest: Effort normal. No respiratory distress. He has no wheezes. He has no rales.   Abdominal: Soft. Bowel sounds are normal. He exhibits no distension.   Musculoskeletal: He exhibits no edema.   Lymphadenopathy:     He has no cervical adenopathy.   Neurological: He is alert and oriented to person, place, and time.   Skin: Skin is warm, dry, not diaphoretic and no rash. Capillary refill takes less than 2 seconds. erythema  Psychiatric: He has a normal mood and affect. His behavior is normal.   Nursing note and vitals reviewed.        Assessment:       1. Cough        Plan:         Cough  -     POCT Influenza A/B  -     promethazine-dextromethorphan (PROMETHAZINE-DM) 6.25-15 mg/5 mL Syrp; Take 5 mLs by mouth every 4 (four) hours as needed.  Dispense: 180 mL; Refill: 0    Flu negative. Not meeting criteria for COVID-19 testing. No significant PMH. Appears well, exam reassuring. OK to return to work.

## 2020-03-16 ENCOUNTER — TELEPHONE (OUTPATIENT)
Dept: EMERGENCY MEDICINE | Facility: HOSPITAL | Age: 20
End: 2020-03-16

## 2020-03-16 NOTE — TELEPHONE ENCOUNTER
Patient left a voicemail on Saturday, March 14, 2020. He stated he needed transportation to one of [facility] centers designated for testing for Covid-19. His employer would not let him [id number] without being cleared.  According to the patients chart he was seen at one of [facility] locations and was cleared for Flu and Covid-19.  I contacted the patient to verify he did not have any needs.  Patient states he did not have any additional needs and had been cleared.    Lorene Goodrich, ED Navigator  [facility]  [phone], ext. [phone]

## 2020-03-25 ENCOUNTER — HOSPITAL ENCOUNTER (EMERGENCY)
Facility: HOSPITAL | Age: 20
Discharge: HOME | End: 2020-03-25
Attending: EMERGENCY MEDICINE
Payer: MEDICAID

## 2020-03-25 VITALS
HEART RATE: 64 BPM | DIASTOLIC BLOOD PRESSURE: 78 MMHG | BODY MASS INDEX: 23.8 KG/M2 | WEIGHT: 170 LBS | HEIGHT: 71 IN | SYSTOLIC BLOOD PRESSURE: 126 MMHG | RESPIRATION RATE: 16 BRPM | TEMPERATURE: 99 F | OXYGEN SATURATION: 98 %

## 2020-03-25 DIAGNOSIS — R07.9 CHEST PAIN: ICD-10-CM

## 2020-03-25 DIAGNOSIS — R05.9 COUGH: Primary | ICD-10-CM

## 2020-03-25 DIAGNOSIS — R09.81 NOSE CONGESTION: ICD-10-CM

## 2020-03-25 PROCEDURE — 93005 ELECTROCARDIOGRAM TRACING: CPT

## 2020-03-25 PROCEDURE — 93010 ELECTROCARDIOGRAM REPORT: CPT | Mod: ,,, | Performed by: INTERNAL MEDICINE

## 2020-03-25 PROCEDURE — 93010 EKG 12-LEAD: ICD-10-PCS | Mod: ,,, | Performed by: INTERNAL MEDICINE

## 2020-03-25 PROCEDURE — 99283 EMERGENCY DEPT VISIT LOW MDM: CPT | Mod: 25

## 2020-03-25 RX ORDER — PROMETHAZINE HYDROCHLORIDE AND DEXTROMETHORPHAN HYDROBROMIDE 6.25; 15 MG/5ML; MG/5ML
5 SYRUP ORAL NIGHTLY PRN
Qty: 118 ML | Refills: 0 | Status: SHIPPED | OUTPATIENT
Start: 2020-03-25 | End: 2020-04-04

## 2020-03-25 NOTE — LETTER
03/25/2020    Visit Date: 3/25/2020    Wilfrido Nguyen was seen in the Emergency Department on 3/25/2020 with onset of symptoms on 3/23/2020, he must continue isolation until:   At least 3 days (72 hours) have passed since recovery defined as resolution of fever without the use of fever-reducing medications and improvement in respiratory symptoms (cough, shortness of breath, etc), and   At least 7 days have passed since symptoms first appeared.   After the appropriate isolation has passed, he/she may return to normal activities and work.     Thank you,                        [facility] [address]  ?  [place] [address]  ?  phone [phone] ?   ?  fax [phone]  ?  [email or web address]

## 2020-03-26 NOTE — DISCHARGE INSTRUCTIONS
§ Please return to the Emergency Department for any new or worsening symptoms including: fever, chest pain, shortness of breath, loss of consciousness, dizziness, weakness, or any other concerns.     § Schedule an appointment for follow up with your Primary Care Doctor as soon as possible for a recheck of your symptoms. If you do not have one, you may contact the one listed on your discharge paperwork or you may also call the [facility] Appointment Desk at [phone] to schedule an appointment with one.     Included in your discharge papers are a list of testing sites if you wish to be tested for COVID.    § Please take all medication as prescribed.     Tylenol or Ibuprofen as needed for pain.

## 2020-03-26 NOTE — ED PROVIDER NOTES
Encounter Date: 3/25/2020       History     Chief Complaint   Patient presents with    Cough     Pt here with reports of cough x 2 days. Pt reports step dad had positive COVID-19 test.     CC:  Cough    HPI: Wilfrido Nguyen, a 19 y.o. male presents to the ED with a 2 day history of cough with occasional productive sputum and nasal congestion.  He reports no fevers, shortness of breath, or chest pain.  Symptoms are constant since onset.  He reports attempting treatment with Phenergan without relief of symptoms.  He does smoke.  He reports his stepfather tested positive for COVID-19.      The history is provided by the patient. No  was used.     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 No pertinent surgical history.  History reviewed. No pertinent family history.  Social History     Tobacco Use    Smoking status: Current Every Day Smoker     Types: Cigars    Smokeless tobacco: Never Used    Tobacco comment: pt. reports he stopped smoning black and milds about 2 weeks ago ( 7/20/19)   Substance Use Topics    Alcohol use: Never     Frequency: Never    Drug use: Never     Review of Systems   Constitutional: Negative for chills and fever.   HENT: Positive for congestion. Negative for trouble swallowing.    Respiratory: Positive for cough. Negative for shortness of breath.    Cardiovascular: Negative for chest pain.   Gastrointestinal: Negative for vomiting.   Musculoskeletal: Negative for back pain, neck pain and neck stiffness.   Skin: Negative for rash and wound.   Neurological: Negative for syncope.   Psychiatric/Behavioral: Negative for confusion.       Physical Exam     Initial Vitals [03/25/20 2147]   BP Pulse Resp Temp SpO2   138/74 66 18 98.4 °F (36.9 °C) 98 %      MAP       --         Physical Exam    Nursing note and vitals  reviewed.  Constitutional: He appears well-developed and well-nourished. He is not diaphoretic. He is cooperative.  Non-toxic appearance. He does not have a sickly appearance. He does not appear ill. No distress.   HENT:   Head: Normocephalic and atraumatic.   Right Ear: Tympanic membrane and external ear normal.   Left Ear: Tympanic membrane and external ear normal.   Mouth/Throat: Oropharynx is clear and moist. No trismus in the jaw.   Eyes: Conjunctivae and EOM are normal. No scleral icterus.   Neck: Normal range of motion and phonation normal. Normal range of motion present. No neck rigidity.   Cardiovascular: Normal rate and regular rhythm.   Pulmonary/Chest: No tachypnea and no bradypnea. No respiratory distress. He has no wheezes. He has no rhonchi. He has no rales. He exhibits tenderness.   Abdominal: He exhibits no distension.   Musculoskeletal: Normal range of motion.   Neurological: He is alert and oriented to person, place, and time. No sensory deficit. Coordination and gait normal. GCS score is 15. GCS eye subscore is 4. GCS verbal subscore is 5. GCS motor subscore is 6.   Skin: Skin is warm and dry. No bruising and no rash noted. No erythema.   Psychiatric: He has a normal mood and affect. His behavior is normal. Judgment and thought content normal.         ED Course   Procedures  Labs Reviewed - No data to display  EKG Readings: (Independently Interpreted)   Initial Reading: No STEMI. Previous EKG Date: 8/2019. Rhythm: Normal Sinus Rhythm. Heart Rate: 63. Ectopy: No Ectopy. ST Segment Elevation: V2. T Waves: Normal. Axis: Normal. Other Impression: EKG similar to previous from August 2019.  Small elevation in V2 without reciprocal changes - ?Early repolarization. NO STEMI by Dr. Rios       Imaging Results          X-Ray Chest AP Portable (Final result)  Result time 03/25/20 22:41:30    Final result by Faraz Norman MD (03/25/20 22:41:30)                 Impression:      No acute intrathoracic  process identified.      Electronically signed by: Faraz Norman MD  Date:    03/25/2020  Time:    22:41             Narrative:    EXAMINATION:  XR CHEST AP PORTABLE    CLINICAL HISTORY:  Cough    TECHNIQUE:  Single frontal view of the chest was performed.    COMPARISON:  03/13/2020.    FINDINGS:  Cardiac silhouette is normal in size.  Lungs are symmetrically expanded.  No evidence of focal consolidative process, pneumothorax, or significant effusion.  No acute osseous abnormality identified.                                 Medical Decision Making:   History:   Old Medical Records: I decided to obtain old medical records.  Old Records Summarized: records from previous admission(s).       <> Summary of Records: Patient seen in this ED on 03/13/2020 - normal chest x-ray negative flu.  Diagnosed with viral URI    Additional MDM:   PERC Rule:   Age is greater than or equal to 50 = 0.0  Heart Rate is greater than or equal to 100 = 0.0  SaO2 on room air < 95% = 0.0  Unilateral leg swelling = 0.0  Hemoptysis = 0.0  Recent surgery or trauma = 0.0  Prior PE or DVT =  0.0  Hormone use = 0.00  PERC Score = 0    APC / Resident Notes:   This is an evaluation of a 19 y.o. male that presents to the Emergency Department for cough and congestion without fever. The patient is a non-toxic, afebrile, and well appearing male. On physical exam: Appears well hydrated with moist mucus membranes. Neck soft and supple with no meningeal signs or cervical lymphadenopathy. Breath sounds are clear and equal bilaterally with no adventitious breath sounds, tachypnea or respiratory distress with room air pulse ox of 98% and no evidence of hypoxia or increased work of breathing. No respiratory distress. Vital Signs Are Reassuring. RESULTS: CXR:  Without pneumothorax, pneumonia, or pleural effusion. After my initial interview with the patient, nurses notes were reviewed - pt reported anterior chest wall pain with inspiration. Pain is mid sternal,  reproducible.  EKG sinus rhythm at a rate of 63 beats per minute.  Small elevation in ST segment in V2 without reciprocal changes.  Likely secondary to early repolarization.  No sign of acute STEMI or lethal arrhythmia.    Patient was not tested for COVID due to the patient not being immunosuppressed, and organ transplant, hemodialysis, COPD, active cancer, active HIV or living in a communal setting or less than 10 weeks of age.  While patient may have had signs of acute upper respiratory infection as directed above in the HPI they did not also have a fever.  Patient was given outpatient resources on where he could be tested in the future.     My overall impression is Cough and Congestion. I considered, but at this time, do not suspect OM, OE, strep pharyngitis, meningitis, pneumonia, bacterial sinusitis, or significant dehydration requiring IV fluids or admission.  Chest pain likely result of cough.  No findings to suggest acute cardiac ischemia or arrhythmia or pulmonary embolism as cause of patient's symptoms.    D/C Meds as prescribed. D/C Information: Tylenol/Ibuprofen PRN, Hydration. Return precautions discussed to return if patient develops fevers, SOB, or other worsening symptoms.  Return to work letter given following recommended quarantine instructions given the patient's close exposure to his stepfather who was diagnosed with COVID-19.  The diagnosis, treatment plan, instructions for follow-up and reevaluation with Primary Care as well as ED return precautions were discussed and understanding was verbalized. All questions or concerns have been addressed.  Case was discussed with an EKG reviewed by Dr. Rios.  CARRILLO Keita, WANDA, FNP-C                                Clinical Impression:       ICD-10-CM ICD-9-CM   1. Cough R05 786.2   2. Nose congestion R09.81 478.19   3. Chest pain R07.9 786.50         Disposition:   Disposition: Discharged  Condition: Stable     ED Disposition Condition    Discharge  Stable        ED Prescriptions     Medication Sig Dispense Start Date End Date Auth. Provider    promethazine-dextromethorphan (PROMETHAZINE-DM) 6.25-15 mg/5 mL Syrp Take 5 mLs by mouth nightly as needed (Cough). 118 mL 3/25/2020 4/4/2020 BEV Gallegos        Follow-up Information     Follow up With Specialties Details Why Contact Info    Your Primary Care Doctor  Schedule an appointment as soon as possible for a visit  Please call and schedule an appointment for follow up this week.     [facility]  Schedule an appointment as soon as possible for a visit  For Follow-Up, This Week, If you do not have a Primary Care Doctor [address] [place] [address]  [phone]      [facility] Emergency Medicine Go to  If symptoms worsen [address]  [facility] [id number] [phone]                                     BEV Gallegos  03/25/20 [id number]